# Patient Record
Sex: FEMALE | Race: WHITE
[De-identification: names, ages, dates, MRNs, and addresses within clinical notes are randomized per-mention and may not be internally consistent; named-entity substitution may affect disease eponyms.]

---

## 2017-09-07 NOTE — ED INTEGUMENTARY GENERAL
General


Chief Complaint:  Skin/Wound Problems


Stated Complaint:  SPIDER BITE RT BUTTOCKS


Nursing Triage Note:  


Pt has wound to R buttock that she thinks may be a spider bite. Pt states she 


first noticed wound on Saturday (9/2). Pt also c/o nausea that started 


yesterday.


Source:  patient, family (son)


Exam Limitations:  no limitations





History of Present Illness


Time seen by provider:  15:28


Initial Comments


Patient presents to ER with chief complaint of a wound on the back of her right 

leg just below her buttock. She says been there since Saturday or Sunday which 

is approximately 5-6 days. She is having some subjective fevers, chills, nausea 

and a bit of pain from the site. She has not sought help yet for this nor she 

been on antibiotic last 4-6 weeks. She does not have a history of hidradenitis 

separative up. She denies laying on that side for a prolonged period of time. 

She is ambulatory. She's never had a wound here before.


Patient is in a pain contract with her primary care physician for hydrocodone 

for her chronic back pain. She denies any other controlled substance use.





Allergies and Home Medications


Allergies


Coded Allergies:  


     propoxyphene (Unverified  Allergy, Mild, 4/29/10)


     adhesive (Unverified  Allergy, Unknown, RASH, 9/7/17)


     Iodinated Contrast- Oral and IV Dye (Verified  Adverse Reaction, 

Intermediate, NAUSEA, 10/26/13)


 SEVERE VOMITTING





Home Medications


Alprazolam 1 Mg Tablet, (Reported)


Aspirin 325 Mg Tabec, 325 MG PO DAILY, (Reported)


Citalopram Hydrobromide 10 Mg Tablet, (Reported)


Fluticasone/Salmeterol 1 Each Blst.w.dev, (Reported)


Hydrocodone/Acetaminophen 1 Each Tablet, (Reported)


Lisinopril 20 Mg Tablet, (Reported)


Omeprazole 20 Mg Capsule., (Reported)


Ondansetron 4 Mg Tab.rapdis, 4 MG PO Q6H PRN for NAUSEA/VOMITING-1ST LINE, #20 

Ref 0


   Prescribed by: DALILA JENKINS on 9/7/17 1622


Oxycodone Hcl/Acetaminophen 1 Each Tablet, 1 EACH PO Q4-6H PRN, #50 Ref 0 (

Reported)


Ranitidine HCl 150 Mg Tablet, (Reported)


Tiotropium Bromide 1 Inh Aerp, (Reported)





Constitutional:  No chills, No diaphoresis, No fever, malaise


EENTM:  No ear discharge, No ear pain


Respiratory:  No cough, No short of breath


Cardiovascular:  No chest pain, No syncope, No vascular heart diseas


Gastrointestinal:  No constipation, No diarrhea, No nausea


Genitourinary:  No discharge, No dysuria


Pregnant:  No


Musculoskeletal:  back pain (Chronic), No joint pain, No joint swelling


Skin:  see HPI


Psychiatric/Neurological:  Denies Headache, Denies Numbness, Denies Paresthesia





Past Medical-Social-Family Hx


Patient Social History


Recent Foreign Travel:  No


Contact w/Someone Who Travel:  No


Recent Infectious Disease Expo:  No





Immunizations Up To Date


Tetanus Booster (TDap):  Unknown


Date of Pneumonia Vaccine:  Jan 1, 2009





Seasonal Allergies


Seasonal Allergies:  Yes ("SINUS PROBLEMS")





Respiratory


Respiratory Disorders:  COPD





Reproductive System


Hx Reproductive Disorders:  No


Sexually Transmitted Disease:  No


HIV/AIDS:  No


GYN History:  Hysterectomy





Genitourinary


Genitourinary Disorders:  Kidney Infection, Kidney Stones





Gastrointestinal


Gastrointestinal Disorders:  Gastroesophageal Reflux, Ulcer





Musculoskeletal


Musculoskeletal Disorders:  Arthritis





HEENT


HEENT Disorders:  Cataract


Loss of Vision:  Denies


Hearing Impairment:  Denies





Psychosocial


Behavioral Health Disorders:  Sleep Difficulties, Anxiety, Depression





Blood Transfusions


Adverse Reaction to a Blood Tr:  No





Family Medical History


Significant Family History:  No Pertinent Family Hx


Family Medial History:  


Cancer


  03 MOTHER (SPINAL AND KIDNEY)


Cataract


  03 MOTHER


Congestive heart failure


  03 FATHER


Family history: Gastrointestinal disease


  09 BROTHER


Family history: Hypertension


  03 FATHER


  03 MOTHER


  09 BROTHER


Myocardial infarction


  03 FATHER


  09 BROTHER


Parkinson's disease


  03 MOTHER


Stroke


  03 MOTHER


  09 BROTHER (TIA)


  09 SISTER (TIA)





Physical Exam


Vital Signs





Vital Sign - Last 12Hours








 9/7/17





 13:55


 


Temp 98.7


 


Pulse 89


 


Resp 18


 


B/P (MAP) 96/59


 


Pulse Ox 96


 


O2 Delivery Room Air





Capillary Refill : Less Than 3 Seconds


General Appearance:  moderate distress, thin


Cardiovascular:  normal peripheral pulses, regular rate, rhythm, no edema


Respiratory:  lungs clear, normal breath sounds


Back:  normal inspection, no vertebral tenderness


Extremities:  non-tender, no pedal edema, normal capillary refill


Neurologic/Psychiatric:  alert, oriented x 3


Skin:  normal color, warm/dry, other (2.5 cm diameter round eschar covering a 

mildly reddened base without any significant induration beyond the borders of 

the eschar over the issue tuberosity right side.)


Lymphatic:  no adenopathy





Progress/Results/Core Measures


Results/Orders


Lab Results





Laboratory Tests








Test


  9/7/17


14:33 Range/Units


 


 


White Blood Count


  8.5 


  4.3-11.0


10^3/uL


 


Red Blood Count


  3.68 L


  4.35-5.85


10^6/uL


 


Hemoglobin 12.1  11.5-16.0  G/DL


 


Hematocrit 34 L 35-52  %


 


Mean Corpuscular Volume 93  80-99  FL


 


Mean Corpuscular Hemoglobin 33  25-34  PG


 


Mean Corpuscular Hemoglobin


Concent 35 


  32-36  G/DL


 


 


Red Cell Distribution Width 11.9  10.0-14.5  %


 


Platelet Count


  238 


  130-400


10^3/uL


 


Mean Platelet Volume 11.0 H 7.4-10.4  FL


 


Neutrophils (%) (Auto) 66  42-75  %


 


Lymphocytes (%) (Auto) 26  12-44  %


 


Monocytes (%) (Auto) 7  0-12  %


 


Eosinophils (%) (Auto) 1  0-10  %


 


Basophils (%) (Auto) 0  0-10  %


 


Neutrophils # (Auto) 5.6  1.8-7.8  X 10^3


 


Lymphocytes # (Auto) 2.2  1.0-4.0  X 10^3


 


Monocytes # (Auto) 0.6  0.0-1.0  X 10^3


 


Eosinophils # (Auto)


  0.1 


  0.0-0.3


10^3/uL


 


Basophils # (Auto)


  0.0 


  0.0-0.1


10^3/uL


 


Sodium Level 137  135-145  MMOL/L


 


Potassium Level 3.6  3.6-5.0  MMOL/L


 


Chloride Level 103    MMOL/L


 


Carbon Dioxide Level 23  21-32  MMOL/L


 


Anion Gap 11  5-14  MMOL/L


 


Blood Urea Nitrogen 15  7-18  MG/DL


 


Creatinine


  1.08 


  0.60-1.30


MG/DL


 


Estimat Glomerular Filtration


Rate 53 


   


 


 


BUN/Creatinine Ratio 14   


 


Glucose Level 109 H   MG/DL


 


Calcium Level 9.0  8.5-10.1  MG/DL


 


Total Bilirubin 1.7 H 0.1-1.0  MG/DL


 


Aspartate Amino Transf


(AST/SGOT) 32 


  5-34  U/L


 


 


Alanine Aminotransferase


(ALT/SGPT) 17 


  0-55  U/L


 


 


Alkaline Phosphatase 98    U/L


 


Total Protein 6.6  6.4-8.2  GM/DL


 


Albumin 4.0  3.2-4.5  GM/DL








My Orders





Orders - DALILA JENKINS


Cbc With Automated Diff (9/7/17 15:27)


Comprehensive Metabolic Panel (9/7/17 15:27)


Ondansetron Injection (Zofran Injectio (9/7/17 15:30)


Ondansetron  Oral Dissolve Tab (Zofran (9/7/17 16:15)


Rx-Ondansetron Po (Rx-Zofran Po) (9/7/17 16:27)


Rx-Ondansetron Po (Rx-Zofran Po) (9/7/17 16:22)





Medications Given in ED





Current Medications








 Medications  Dose


 Ordered  Sig/Latoya


 Route  Start Time


 Stop Time Status Last Admin


Dose Admin


 


 Ondansetron HCl  4 mg  ONCE  ONCE


 PO  9/7/17 16:15


 9/7/17 16:16 DC 9/7/17 16:09


4 MG








Vital Signs/I&O





Vital Sign - Last 12Hours








 9/7/17





 13:55


 


Temp 98.7


 


Pulse 89


 


Resp 18


 


B/P (MAP) 96/59


 


Pulse Ox 96


 


O2 Delivery Room Air














Blood Pressure Mean:  71











Consults


Consults :  


   Consulting Physician:  PADDY MCELROY MD


Consults Notes


Discussed the case and he agrees with this can be [facility] outpatient and would 

be happy to see it in the clinic afternoon Monday. He asked that we call the 

clinic get this scheduled for her.





Departure


Impression


Impression:  


 Primary Impression:  


 Pressure ulcer


 Qualified Codes:  L89.310 - Pressure ulcer of right buttock, unstageable


Disposition:  01 HOME, SELF-CARE


Condition:  Stable





Departure-Patient Inst.


Decision time for Depature:  16:19


Referrals:  


SWAPNA FOWLER DO (PCP/Family)


Primary Care Physician


Patient Instructions:  Pressure Sores (DC)





Add. Discharge Instructions:  


Keep the wound clean and use soap and water. If you have fever or intractable 

nausea and diarrhea you should return to the ER otherwise  the Zofran 

and take one tablet under the tongue every 6 hours as needed to control your 

nausea. Plan on following up with the general surgeon, Dr. Mcelroy on Monday, September 11 at 4 PM. If you need to reschedule for have questions his clinic 

number is [phone]. All discharge instructions reviewed with patient and/or 

family. Voiced understanding.


Scripts


Ondansetron (Zofran Odt) 4 Mg Tab.rapdis


4 MG PO Q6H Y for NAUSEA/VOMITING-1ST LINE, #20 TAB 0 Refills


   Prov: DALILA JENKINS         9/7/17





Copy


Copies To 1:   PADDY MCELROY MD, TITUS J Sep 7, 2017 15:30

## 2019-07-30 NOTE — DIAGNOSTIC IMAGING REPORT
PROCEDURE: CT head and CT cervical spine without contrast.



TECHNIQUE: Multiple contiguous axial images were obtained through

the brain and cervical spine without the use of intravenous

contrast. Sagittal and coronal reformations through the cervical

spine were then performed. Auto Exposure Controls were utilized

during the CT exam to meet ALARA standards for radiation dose

reduction. 



INDICATION: Fall.



COMPARISON: Comparison is made with prior CT from 10/09/2009.



FINDINGS:



CT head:



Ventricles and sulci are within normal limits. No sulcal

effacement or midline shift is seen. No acute intra-axial or

extra-axial hemorrhage is detected. Cisterns are patent. The

visualized paranasal sinuses are clear.



IMPRESSION: No acute intracranial process is detected.



CT cervical spine:



Curvature and alignment of the cervical spine is normal. There is

some generalized degenerative disc and facet disease with

variable disc space narrowing and marginal spurring. No fractures

are seen. Prevertebral tissues are within normal limits. The

odontoid is intact.



IMPRESSION: Cervical spondylosis. No acute bony abnormality is

detected.



Dictated by: 



  Dictated on workstation # [id number]

## 2019-07-30 NOTE — XMS REPORT
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                                             -                

 

                    Urine Ketones                      2013 12:00pm                      NEGATIVE

                                                    -                

 

                          Urine Leukocyte Esterase                      2013 12:00pm            

                    1+                      H                      -                

 

                          Urine Methamphetamines Screen                      2009 9:05am        

                    NEGATIVE                                             -                

 

                    Urine Mucus                      2013 12:00pm                      SMALL

  /LPF                      H                         -                

 

                    Urine Nitrite                      2013 12:00pm                      POSITIVE

                          H                         -                

 

                          Urine Opiates Screen                      2009 9:05am                 

                    POSITIVE                      H                      -                

 

                          Urine Phencyclidine Screen                      2009 9:05am           

                    NEGATIVE                                             -                

 

                          Urine Pregnancy Test                      2009 12:48am                  

                    NEGATIVE                                             -                

 

                    Urine Protein                      2013 12:00pm                      2+ 

                          H                         -                

 

                    Urine RBC                      2013 12:00pm                      NONE  /HPF

                                                    -                

 

                          Urine Specific Gravity                      2013 12:00pm              

                    1.020                                             -                

 

                          Urine Squamous Epithelial Cells                      2013 12:00pm     

                    2-5  /HPF                                             -               

 

 

                          Urine Urobilinogen                      2013 12:00pm                  

                    NORMAL  MG/DL                                             -                

 

                    Urine WBC                      2013 12:00pm                      5-10  /HPF

                          H                         -                

 

                    Urine pH                      2013 12:00pm                      6       

                                                    -                

 

                          Vancomycin Level Trough                      May 16, 2010 1:12am                  

                    6.6  MCG/ML                      L                      10-20                

 

                    White Blood Count                      2013 5:22am                      

8.2  10^3/uL                      N                         4.3-11.0                

 

                    Barbiturate Screen                      July 15, 2006 2:05am                      Negative

                                                    -                

 

                    Serum Alcohol                      2009 8:17am                      < 5 

 MG/DL                                                -5                

 

                          Lab Scanned Report                      September 15, 2010 8:00pm                 

                    Referred Lab Report  [id number]                                             -        

        

 

                          Estimat Glomerular Filtration Rate                      2013 11:05am  

                    > 60                                             -                

 

                          Blood Morphology Comment                      October 10, 2009 6:29am             

                    NORMAL                                             -                

 

                          Urine Methadone Screen                      2009 9:05am               

                    POSITIVE                      H                      -                

 

                          Creatine Kinase                      September 15, 2010 11:24pm                   

                    96  mg/dl                      N                                      

 

                          Urine Cannabinoids Screen                      2009 9:05am            

                    NEGATIVE                                             -                

 

                          Cardiac Panel Pathologist Review                      September 15, 2010 5:10pm   

                    SEE CARDIAC PATH REV                                             -  

              

 

                          Stool Occult Blood Immunoassay                      2009 9:40am         

                    NEGATIVE                                             -                

 

                    Urine RBC (Auto)                      2013 12:00pm                      

2+                        H                         -                



                                                                                
                                                                                
                                                                                
                                                                                
                                                                                
                                                                                
                                                                                
                                                                                
                                                                                
                                                                                
                                                                                
                                                                                
                                                                                
                                                                 



Procedures

                      





                    Procedure                      Code                      Date                

 

                    COLONOSCOPY                      45.23                      09                



 

                    TOTAL HIP REPLACEMENT                      81.51                      05/24/10      

          

 

                    TOTAL HIP REPLACEMENT                      81.51                      05/11/10      

          

 

                    VENOUS CATHETERIZATION NEC                      38.93                      05/16/10 

               

 

                    Blood Culture                                             05/14/10                

 

                    Ova and Parasites                                             09              

  

 

                    MRSA Screen                                             05/23/10                

 

                    Urine Culture                                             10/26/13                

 

                    Gram Stain                                             08                



                                                                                
                                                                             



Encounters

                      





                    Encounter                      Location                      Date/Time              

  

 

                    Pre-admitted Inpatient                      [facility]                      10/28/13

 9:10am                

 

                    Discharged Inpatient                      [facility]                      10/26/13

 2:25pm                

 

                    Departed Emergency Room                      [facility]                      13

 10:01pm

## 2019-07-30 NOTE — DIAGNOSTIC IMAGING REPORT
INDICATION: Fall with chest pain.



Frontal chest obtained at 12:11 p.m. and compared to 09/15/2010.



Heart and mediastinal silhouette are normal in appearance. The

lungs show no focal infiltrate. There is a calcified granuloma in

the right upper lobe. There is no pneumothorax or pleural fluid.

There is no overt bony abnormality visualized in the chest.



IMPRESSION: No acute process in the chest.



Dictated by: 



  Dictated on workstation # [id number]

## 2019-07-30 NOTE — ED SYNCOPE
General


Stated Complaint:  FALL


Source of Information:  Patient


Exam Limitations:  No Limitations





History of Present Illness


Date Seen by Provider:  2019


Time Seen by Provider:  10:05


Initial Comments


Here with report of syncopal episode this morning. She apparently was walking 

across the street when she passed out as she was stepping up on a curb. This was

witnessed by a neighbor. She complains of some mild neck pain. She is unsure why

she passed out but states that she has not been eating or drinking well. She 

does have a cough. Complains of epigastric abdominal pain that radiates up to 

her chest. EMS unable to start IV due to poor veins. Further questioning later, 

patient reports that she's had some episodes of the stomach discomfort that 

radiates up into her chest after stressful events. She had that yesterday after 

an argument with one of her signs and again today for the same reason that's why

she was walking across history because she was walking away from him when the 

episode occurred.


Timing/Prior Episodes:  No Prior History


Symptoms Prior to Episode:  Lightheadedness


Precipitating Factors:  Activity


Loss of Consciousness:  Brief (Seconds)


Current Symptoms:  Chest Pain, Headache, Lightheadedness; No Shallow/Rapid 

Breathing, No Weak/Absent Pulse; Weakness





Allergies and Home Medications


Allergies


Coded Allergies:  


     propoxyphene (Unverified  Allergy, Mild, 4/29/10)


     adhesive (Unverified  Allergy, Unknown, RASH, 17)


     Iodinated Contrast- Oral and IV Dye (Verified  Adverse Reaction, 

Intermediate, NAUSEA, 10/26/13)


   


   SEVERE VOMITTING





Home Medications


Aspirin 325 Mg Tabec, 325 MG PO DAILY, (Reported)


Ondansetron 4 Mg Tab.rapdis, 4 MG PO Q6H PRN for NAUSEA/VOMITING-1ST LINE


   Prescribed by: DALILA JENKINS on 17 1622


Oxycodone Hcl/Acetaminophen 1 Each Tablet, 1 EACH PO Q4-6H PRN, (Reported)





Patient Home Medication List


Home Medication List Reviewed:  Yes





Review of Systems


Constitutional:  see HPI; No chills, No fever


EENTM:  no symptoms reported


Respiratory:  No cough, No short of breath


Cardiovascular:  chest pain; No palpitations; syncope


Gastrointestinal:  abdominal pain; No nausea, No vomiting


Genitourinary:  no symptoms reported


Musculoskeletal:  muscle pain, neck pain


Skin:  no symptoms reported


Psychiatric/Neurological:  See HPI, Headache; Denies Numbness, Denies Tingling





All Other Systems Reviewed


Negative Unless Noted:  Yes





Past Medical-Social-Family Hx


Past Med/Social Hx:  Reviewed Nursing Past Med/Soc Hx


Patient Social History


Alcohol Use:  Denies Use


Recreational Drug Use:  No


Smoking Status:  Current Everyday Smoker


Type Used:  Cigarettes


Recent Hopitalizations:  Yes (PNEUMONIA, KIDNEY INFECTIONS, SURGERIES)





Immunizations Up To Date


Tetanus Booster (TDap):  Unknown


Date of Pneumonia Vaccine:  2009





Seasonal Allergies


Seasonal Allergies:  Yes ("SINUS PROBLEMS")





Past Medical History


Surgeries:  Yes (HYST,APPY,SAMARIA, COCCYX BONE, R HIP)


Appendectomy, Gallbladder, Hysterectomy, Orthopedic


Respiratory:  Yes


COPD


Cardiac:  Yes (WPW)


Neurological:  No


Reproductive Disorders:  No


GYN History:  Hysterectomy


Sexually Transmitted Disease:  No


HIV/AIDS:  No


Kidney Infection, Kidney Stones


Gastrointestinal:  Yes


Gastroesophageal Reflux, Ulcer


Musculoskeletal:  Yes


Arthritis


Endocrine:  No


Cataract


Loss of Vision:  Denies


Hearing Impairment:  Denies


Cancer:  No


Psychosocial:  No


Sleep Difficulties, Anxiety, Depression


Integumentary:  No (BLISTERS FROM RECENT TAPE)


Blood Disorders:  No


Adverse Reaction/Blood Tranf:  No





Family Medical History


Reviewed Nursing Family Hx





Cancer


  03 MOTHER (SPINAL AND KIDNEY)


Cataract


  03 MOTHER


Congestive heart failure


  03 FATHER


Family history: Gastrointestinal disease


  09 BROTHER


Family history: Hypertension


  03 FATHER


  03 MOTHER


  09 BROTHER


Myocardial infarction


  03 FATHER


  09 BROTHER


Parkinson's disease


  03 MOTHER


Stroke


  03 MOTHER


  09 BROTHER (TIA)


  09 SISTER (TIA)


No Pertinent Family Hx





Physical Exam


Vital Signs





Vital Signs - First Documented








 19





 10:04


 


Temp 96.7


 


Pulse 86


 


Resp 12


 


B/P (MAP) 94/81 (85)





Capillary Refill :


Height, Weight, BMI


Height: 5'5.00"


Weight: 154lbs. oz. 69.451438lo;  BMI


Method:Stated


General Appearance:  No Apparent Distress, WD/WN


HEENT:  PERRL/EOMI, Pharynx Normal


Neck:  Non Tender, Supple


Cardiovascular:  Regular Rate, Rhythm, No Murmur


Respiratory:  Lungs Clear, Normal Breath Sounds


Gastrointestinal:  Non Tender, Soft


Back:  Normal Inspection, No CVA Tenderness, No Vertebral Tenderness


Neurologic/Psychiatric:  Alert, Oriented x3


Cranial Nerves:  Normal Speech, PERRL


Motor/Sensory:  No Motor Deficit, No Sensory Deficit


Skin:  Normal Color, Warm/Dry





Progress/Results/Core Measures


Results/Orders


Lab Results





Laboratory Tests








Test


 19


10:22 19


11:24 19


12:33 Range/Units


 


 


Troponin I < 0.028    <0.028  NG/ML


 


White Blood Count


 


 8.7 


 


 4.3-11.0


10^3/uL


 


Red Blood Count


 


 4.06 L


 


 4.35-5.85


10^6/uL


 


Hemoglobin  13.1   11.5-16.0  G/DL


 


Hematocrit  37   35-52  %


 


Mean Corpuscular Volume  90   80-99  FL


 


Mean Corpuscular Hemoglobin  32   25-34  PG


 


Mean Corpuscular Hemoglobin


Concent 


 36 


 


 32-36  G/DL





 


Red Cell Distribution Width  13.1   10.0-14.5  %


 


Platelet Count


 


 162 


 


 130-400


10^3/uL


 


Mean Platelet Volume  9.9   7.4-10.4  FL


 


Neutrophils (%) (Auto)  44   42-75  %


 


Lymphocytes (%) (Auto)  45 H  12-44  %


 


Monocytes (%) (Auto)  9   0-12  %


 


Eosinophils (%) (Auto)  2   0-10  %


 


Basophils (%) (Auto)  1   0-10  %


 


Neutrophils # (Auto)  3.8   1.8-7.8  X 10^3


 


Lymphocytes # (Auto)  3.9   1.0-4.0  X 10^3


 


Monocytes # (Auto)  0.8   0.0-1.0  X 10^3


 


Eosinophils # (Auto)


 


 0.2 


 


 0.0-0.3


10^3/uL


 


Basophils # (Auto)


 


 0.1 


 


 0.0-0.1


10^3/uL


 


Sodium Level  140   135-145  MMOL/L


 


Potassium Level  3.9   3.6-5.0  MMOL/L


 


Chloride Level  110 H    MMOL/L


 


Carbon Dioxide Level  16 L  21-32  MMOL/L


 


Anion Gap  14   5-14  MMOL/L


 


Blood Urea Nitrogen  14   7-18  MG/DL


 


Creatinine


 


 0.98 


 


 0.60-1.30


MG/DL


 


Estimat Glomerular Filtration


Rate 


 59 


 


  





 


BUN/Creatinine Ratio  14    


 


Glucose Level  72     MG/DL


 


Calcium Level  9.4   8.5-10.1  MG/DL


 


Corrected Calcium  9.2   8.5-10.1  MG/DL


 


Magnesium Level  2.4   1.8-2.4  MG/DL


 


Total Bilirubin  0.7   0.1-1.0  MG/DL


 


Aspartate Amino Transf


(AST/SGOT) 


 25 


 


 5-34  U/L





 


Alanine Aminotransferase


(ALT/SGPT) 


 11 


 


 0-55  U/L





 


Alkaline Phosphatase  128     U/L


 


C-Reactive Protein High


Sensitivity 


 0.28 


 


 0.00-0.50


MG/DL


 


Total Protein  7.4   6.4-8.2  GM/DL


 


Albumin  4.3   3.2-4.5  GM/DL


 


Urine Color   YELLOW   


 


Urine Clarity   CLEAR   


 


Urine pH   5  5-9  


 


Urine Specific Gravity   1.015 L 1.016-1.022  


 


Urine Protein   NEGATIVE  NEGATIVE  


 


Urine Glucose (UA)   NEGATIVE  NEGATIVE  


 


Urine Ketones   NEGATIVE  NEGATIVE  


 


Urine Nitrite   NEGATIVE  NEGATIVE  


 


Urine Bilirubin   NEGATIVE  NEGATIVE  


 


Urine Urobilinogen   NORMAL  NORMAL  MG/DL


 


Urine Leukocyte Esterase   NEGATIVE  NEGATIVE  


 


Urine RBC (Auto)   NEGATIVE  NEGATIVE  


 


Urine RBC   NONE   /HPF


 


Urine WBC   NONE   /HPF


 


Urine Squamous Epithelial


Cells 


 


 RARE 


  /HPF





 


Urine Crystals   NONE   /LPF


 


Urine Bacteria   NEGATIVE   /HPF


 


Urine Casts   NONE   /LPF


 


Urine Mucus   NEGATIVE   /LPF


 


Urine Culture Indicated   NO   








My Orders





Orders - SWAPNA MAIER MD


Ns Iv 1000 Ml (Sodium Chloride 0.9%) (19 10:15)


Ct Head/Cervical Spine Wo (19 10:25)


Chest 1 View, Ap/Pa Only (19 10:25)


Cbc With Automated Diff (19 10:25)


Comprehensive Metabolic Panel (19 10:25)


Hs C Reactive Protein (19 10:25)


Magnesium (19 10:25)


Ua Culture If Indicated (19 10:25)


Ekg Tracing (19 10:25)


Monitor-Rhythm Ecg Trace Only (19 10:25)


Ed Iv/Invasive Line Start (19 10:25)


Ns Iv 1000 Ml (Sodium Chloride 0.9%) (19 10:25)


Lidocaine 2% Viscous 15 Ml (Xylocaine Vi (19 10:45)


Antacid  Suspension (Mylanta  Suspension (19 10:45)


Famotidine Injection (Pepcid Injection) (19 10:32)


Troponin I (19 10:25)


Aspirin Chewable Tablet (Baby Aspirin Ch (19 12:25)





Medications Given in ED





Current Medications








 Medications  Dose


 Ordered  Sig/Latoya


 Route  Start Time


 Stop Time Status Last Admin


Dose Admin


 


 Al Hydrox/Mg


 Hydrox/Simethicone  30 ml  ONCE  ONCE


 PO  19 10:45


 19 10:46 DC 19 10:59


30 ML


 


 Lidocaine HCl  15 ml  ONCE  ONCE


 PO  19 10:45


 19 10:46 DC 19 10:59


15 ML


 


 Sodium Chloride  1,000 ml @ 


 0 mls/hr  Q0M ONCE


 IV  19 10:25


 19 10:28 DC 19 10:22


0 MLS/HR








Vital Signs/I&O











 19





 10:04


 


Temp 96.7


 


Pulse 86


 


Resp 12


 


B/P (MAP) 94/81 (85)











Progress


Progress Note :  


Progress Note


Seen and evaluated. CT head and neck ordered. We will get labs, EKG and chest x-

ray due to the chest discomfort. Normal saline 1 L bolus. Monitor patient. 1220:

Patient has removed her c-collar. CT is negative so we will ahead and cleared 

her C-spine. Still pending troponin level. Patient did get GI cocktail and 

Pepcid 20 mg IV and that seems to help. We'll go ahead and give her aspirin now.

Monitor patient. 1325: Troponin negative. Discharged home with return 

precautions. Patient verbalize understanding instructions and agreement with 

plan. She will follow-up with cardiology and information was given to her.


Initial ECG Impression Date:  2019


Initial ECG Impression Time:  10:10


Initial ECG Rate:  79


Initial ECG Rhythm:  Normal Sinus


Comment


Sinus rhythm with flat T waves throughout. No evidence of ST elevation MI. 

Similar to previous of 9/15/10. Normal axis. Interpreted by me.





Diagnostic Imaging





   Diagonstic Imaging:  CT


   Plain Films/CT/US/NM/MRI:  c-spine, head


Comments


                 [facility].


                                [place], [place]





NAME:   YVONNE NICOLAS J


MED REC#:   [id number]


ACCOUNT#:   [id number]


PT STATUS:   REG ER


:   1960


PHYSICIAN:   SWAPNA MAIER MD


ADMIT DATE:   19/ER


                                   ***Draft***


Date of Exam:19





CT HEAD/CERVICAL SPINE WO








PROCEDURE: CT head and CT cervical spine without contrast.





TECHNIQUE: Multiple contiguous axial images were obtained through


the brain and cervical spine without the use of intravenous


contrast. Sagittal and coronal reformations through the cervical


spine were then performed. Auto Exposure Controls were utilized


during the CT exam to meet ALARA standards for radiation dose


reduction. 





INDICATION: Fall.





COMPARISON: Comparison is made with prior CT from 10/09/2009.





FINDINGS:





CT head:





Ventricles and sulci are within normal limits. No sulcal


effacement or midline shift is seen. No acute intra-axial or


extra-axial hemorrhage is detected. Cisterns are patent. The


visualized paranasal sinuses are clear.





IMPRESSION: No acute intracranial process is detected.





CT cervical spine:





Curvature and alignment of the cervical spine is normal. There is


some generalized degenerative disc and facet disease with


variable disc space narrowing and marginal spurring. No fractures


are seen. Prevertebral tissues are within normal limits. The


odontoid is intact.





IMPRESSION: Cervical spondylosis. No acute bony abnormality is


detected.





  Dictated on workstation # [id number]








Dict:   19 1203


Trans:   19 1209


[facility] [id number]





Interpreted by:     SKY MEZA MD


Electronically signed by:








   Diagonstic Imaging:  Xray


   Plain Films/CT/US/NM/MRI:  chest


Comments


                 [facility] [place], [place]





NAME:   YVONNE NICOLAS J


MED REC#:   [id number]


ACCOUNT#:   [id number]


PT STATUS:   REG ER


:   1960


PHYSICIAN:   SWAPNA MAIER MD


ADMIT DATE:   19/ER


                                   ***Draft***


Date of Exam:19





CHEST 1 VIEW, AP/PA ONLY








INDICATION: Fall with chest pain.





Frontal chest obtained at 12:11 p.m. and compared to 09/15/2010.





Heart and mediastinal silhouette are normal in appearance. The


lungs show no focal infiltrate. There is a calcified granuloma in


the right upper lobe. There is no pneumothorax or pleural fluid.


There is no overt bony abnormality visualized in the chest.





IMPRESSION: No acute process in the chest.





  Dictated on workstation # [id number]








Dict:   19 1222


Trans:   19 1237


 [id number]





Interpreted by:     SEAN WILKINSON MD


Electronically signed by:





Departure


Impression





   Primary Impression:  


   Syncope


   Qualified Codes:  R55 - Syncope and collapse


   Additional Impressions:  


   Acute epigastric pain


   Acute chest pain


Disposition:  01 HOME, SELF-CARE


Condition:  Stable





Departure-Patient Inst.


Decision time for Depature:  13:23


Referrals:  


SVETA RIVAS M RIZWAN MD SPRENKLE, TIMOTHY L DO (PCP)


Primary Care Physician


Patient Instructions:  Chest Pain, Acute Abdomen (Belly Pain), Adult (DC), 

Syncope (Fainting)





Add. Discharge Instructions:  


Call and make appointment with Dr. Huber, cardiologist, for appointment within 

the next week or 2. You should also call Dr. Rivas's office (pulmonologist) for

appointment and follow-up with him as well for your lung problems. Return for 

worse pain, fever, vomiting, weakness, breathing problems or other concerns as 

needed. Continue to stop smoking as you are right now.


Scripts


Ipratropium Bromide (Atrovent Hfa) 12.9 Gm Aers


2 PUFF IH Q6H, #1 INHALER 0 Refills


   Prov: SWAPNA MAIER MD         19


Work/School Note:  Local Medical Staff Listing











SWAPNA MAIER MD          2019 11:10

## 2019-07-30 NOTE — XMS REPORT
Continuity of Care Document

                             Created on: 2019



YVONNE NICOLAS

External Reference #: [id number]

: 1960

Sex: Female



Demographics







                          Address                   [address] [place]  [address]

 

                          Home Phone                [phone] x

 

                          Preferred Language        Unknown

 

                          Marital Status            Unknown

 

                          Denominational Affiliation     Unknown

 

                          Race                      Unknown

 

                          Ethnic Group              Unknown





Author







                          Organization              Unknown

 

                          Address                   Unknown

 

                          Phone                     Unavailable



              



Allergies

      





             Active              Description              Code              Type              Severity

                Reaction              Onset              Reported/Identified              Relationship

 to Patient                             Clinical Status        

 

                Yes              propoxyphene              [id number]              Drug Allergy       

             Mild              N/A                             2010                        

                                                 

 

                    Yes                 Iodinated Contrast- Oral and IV Dye              [id number]        

             Drug Allergy              Moderate              NAUSEA                             10/26/2013

                                                             

 

                Yes              adhesive              [id number]              Drug Allergy           

             Unknown              RASH                             2017                        

                                                 



                      



Medications

      



There is no data.                  



Problems

      





             Date Dx Coded              Attending              Type              Code              Diagnosis

                                        Diagnosed By        

 

                10/28/2013              GHASSAN ZHU MD              Ot              008.8       

                          VIRAL ENTERITIS NOS                       

 

                10/28/2013              GHASSAN ZHU MD              Ot              041.49      

                          OTHER AND UNSPECIFIED ESCHERICHIA COLI [                       

 

                10/28/2013              GHASSAN ZHU MD              Ot              305.1       

                          TOBACCO USE DISORDER                       

 

                10/28/2013              GHASSAN ZHU MD              Ot              599.0       

                          URIN TRACT INFECTION NOS                       

 

                10/28/2013              GHASSAN ZHU MD              Ot              719.45      

                          JOINT PAIN-PELVIS                       

 

                2013              YOHANNES ZAVALETA MD              Ot              305.1         

                          TOBACCO USE DISORDER                       

 

                2013              YOHANNES ZAVALETA MD              Ot              496           

                          CHR AIRWAY OBSTRUCT NEC                       

 

                2013              YOHANNES ZAVALETA MD              Ot              996.41        

                          MECHANICAL LOOSENING OF PROSTHETIC JOINT                       

 

                2013              YOHANNES ZAVALETA MD              Ot              V43.64        

                          HIP JOINT REPLACEMENT STATUS                       

 

                2013              TRAM WOOD MD              Ot              959.6        

                          HIP   THIGH INJURY NOS                       

 

                2013              TRAM WOOD MD              Ot              959.7        

                          LOWER LEG INJURY NOS                       

 

                2013              TRAM WOOD MD              Ot              E000.8       

                          OTHER EXTERNAL CAUSE STATUS                       

 

                2013              TRAM WOOD MD              Ot              E849.0       

                          ACCIDENT IN HOME                       

 

                2013              TRAM WOOD MD              Ot              E884.4       

                          FALL FROM BED                       

 

                2017              YOHANNES ZAVALETA MD              Ot              V43.64        

                          HIP JOINT REPLACEMENT STATUS                       

 

                2017              YOHANNES ZAVALETA MD              Ot              V67.09        

                          SURGERY FOLLOW-UP, OTHER SURGERY                       

 

                2017              YOHANNES ZAVALETA MD              Ot              996.41        

                          MECHANICAL LOOSENING OF PROSTHETIC JOINT                       

 

                2017              YOHANNES ZAVALETA MD              Ot              V43.64        

                          HIP JOINT REPLACEMENT STATUS                       

 

                2017              YOHANNES ZAVALETA MD              Ot              791.9         

                          ABN URINE FINDINGS NEC                       

 

                2017              YOHANNES ZAVALETA MD              Ot              996.41        

                          MECHANICAL LOOSENING OF PROSTHETIC JOINT                       

 

                2017              YOHANNES ZAVALETA MD              Ot              V72.63        

                          PRE-PROCEDURAL LABORATORY EXAMINATION                       

 

                2017              DALILA JENKINS MD              Ot              F32.9         

                          MAJOR DEPRESSIVE DISORDER, SINGLE EPISOD                       

 

                2017              DALILA JENKINS MD              Ot              F41.9         

                          ANXIETY DISORDER, UNSPECIFIED                       

 

                2017              DALILA JENKINS MD              Ot              J44.9         

                          CHRONIC OBSTRUCTIVE PULMONARY DISEASE, U                       

 

                2017              DALILA JENKINS MD              Ot              K21.9         

                          GASTRO-ESOPHAGEAL REFLUX DISEASE WITHOUT                       

 

                2017              DALILA JENKINS MD              Ot              L89.310       

                          PRESSURE ULCER OF RIGHT BUTTOCK, UNSTAGE                       

 

                2017              DALILA JENKINS MD              Ot              M19.90        

                          UNSPECIFIED OSTEOARTHRITIS, UNSPECIFIED                        

 

                2017              DALILA JENKINS MD              Ot              Z79.82        

                          LONG TERM (CURRENT) USE OF ASPIRIN                       

 

                2017              DALILA JENKINS MD              Ot              Z82.49        

                          FAMILY HX OF ISCHEM HEART DIS AND OTH DI                       

 

                2017              DALILA JENKINS MD              Ot              Z85.528       

                          PERSONAL HISTORY OF OTHER MALIGNANT NEOP                       

 

                2017              DALILA JENKINS MD              Ot              Z87.442       

                          PERSONAL HISTORY OF URINARY CALCULI                       

 

                2017              DALILA JENKINS MD              Ot              Z87.448       

                          PERSONAL HISTORY OF OTHER DISEASES OF UR                       

 

                2017              DALILA JENKINS MD              Ot              Z90.710       

                          ACQUIRED ABSENCE OF BOTH CERVIX AND UTER                       

 

                2017              DALILA JENKINS MD              Ot              Z04.3         

                          ENCOUNTER FOR EXAM AND OBSERVATION FOLLO                       

 

                2017              DALILA JENKINS MD              Ot              F32.9         

                          MAJOR DEPRESSIVE DISORDER, SINGLE EPISOD                       

 

                2017              DALILA JENKINS MD              Ot              F41.9         

                          ANXIETY DISORDER, UNSPECIFIED                       

 

                2017              DALILA JENKINS MD              Ot              J44.9         

                          CHRONIC OBSTRUCTIVE PULMONARY DISEASE, U                       

 

                2017              DALILA JENKINS MD              Ot              K21.9         

                          GASTRO-ESOPHAGEAL REFLUX DISEASE WITHOUT                       

 

                2017              DALILA JENKINS MD              Ot              L89.310       

                          PRESSURE ULCER OF RIGHT BUTTOCK, UNSTAGE                       

 

                2017              DALILA JENKINS MD              Ot              M19.90        

                          UNSPECIFIED OSTEOARTHRITIS, UNSPECIFIED                        

 

                2017              DALILA JENKINS MD              Ot              Z79.82        

                          LONG TERM (CURRENT) USE OF ASPIRIN                       

 

                2017              DALILA JENKINS MD              Ot              Z82.49        

                          FAMILY HX OF ISCHEM HEART DIS AND OTH DI                       

 

                2017              DALILA JENKINS MD              Ot              Z85.528       

                          PERSONAL HISTORY OF OTHER MALIGNANT NEOP                       

 

                2017              DALILA JENKINS MD              Ot              Z87.442       

                          PERSONAL HISTORY OF URINARY CALCULI                       

 

                2017              DALILA JENKINS MD              Ot              Z87.448       

                          PERSONAL HISTORY OF OTHER DISEASES OF UR                       

 

                2017              DALILA JENKINS MD              Ot              Z90.710       

                          ACQUIRED ABSENCE OF BOTH CERVIX AND UTER                       

 

                2017              REVEAL YOHANNES CARRILLO              Ot              V43.64        

                          HIP JOINT REPLACEMENT STATUS                       

 

                2017              REVEAL YOHANNES CARRILLO Ot              V67.09        

                          SURGERY FOLLOW-UP, OTHER SURGERY                       

 

                2017              REVEAL YOHANNES CARRILLO              Ot              996.41        

                          MECHANICAL LOOSENING OF PROSTHETIC JOINT                       

 

                2017              REVEAL YOHANNES CARRILLO Ot              V43.64        

                          HIP JOINT REPLACEMENT STATUS                       

 

                2017              REVEAL YOHANNES CARRILLO              Ot              791.9         

                          ABN URINE FINDINGS NEC                       

 

                2017              REVEAL YOHANNES CARRILLO Ot              996.41        

                          MECHANICAL LOOSENING OF PROSTHETIC JOINT                       

 

                2017              REVEAL YOHANNES CARRILLO              Ot              V72.63        

                          PRE-PROCEDURAL LABORATORY EXAMINATION                       

 

                09/15/2017              REVEAL YOHANNES CARRILLO Ot              V43.64        

                          HIP JOINT REPLACEMENT STATUS                       

 

                09/15/2017              REVEAL YOHANNES CARRILLO Ot              V67.09        

                          SURGERY FOLLOW-UP, OTHER SURGERY                       

 

                09/15/2017              REVEAL YOHANNES CARRILLO Ot              996.41        

                          MECHANICAL LOOSENING OF PROSTHETIC JOINT                       

 

                09/15/2017              REVEAL YOHANNES CARRILLO Ot              V43.64        

                          HIP JOINT REPLACEMENT STATUS                       

 

                09/15/2017              REVEAL YOHANNES CARRILLO              Ot              791.9         

                          ABN URINE FINDINGS NEC                       

 

                09/15/2017              REVEAL YOHANNES CARRILLO              Ot              996.41        

                          MECHANICAL LOOSENING OF PROSTHETIC JOINT                       

 

                09/15/2017              REVEAL YOHANNES CARRILLO              Ot              V72.63        

                          PRE-PROCEDURAL LABORATORY EXAMINATION                       

 

                2017              EVA VIDAL MD, Ot              L98.492      

                          NON-PRS CHRONIC ULCER OF SKIN OF SITES W                       

 

                2017              EVA VIDAL MD, Ot              T63.331A     

                          TOXIC EFFECT OF VENOM OF BROWN RECLUSE S                       

 

                2017              EVA VIDAL MD, Ot              T65.222A     

                          TOXIC EFFECT OF TOBACCO CIGARETTES, SELF                       

 

                2017              EVA VIDAL MD, Ot              L98.492      

                          NON-PRS CHRONIC ULCER OF SKIN OF SITES W                       

 

                2017              EVA VIDAL MD, Ot              T63.331A     

                          TOXIC EFFECT OF VENOM OF BROWN RECLUSE S                       

 

                2017              EVA VIDAL MD, Ot              T65.222A     

                          TOXIC EFFECT OF TOBACCO CIGARETTES, SELF                       

 

                10/06/2017              EVA VIDAL MD, Ot              L98.492      

                          NON-PRS CHRONIC ULCER OF SKIN OF SITES W                       

 

                10/06/2017              EVA VIDAL MD, Ot              T63.331A     

                          TOXIC EFFECT OF VENOM OF BROWN RECLUSE S                       

 

                10/06/2017              EVA VIDAL MD, Ot              T65.222A     

                          TOXIC EFFECT OF TOBACCO CIGARETTES, SELF                       

 

                10/06/2017              EVA VIDAL MD, Ot              B18.9        

                          CHRONIC VIRAL HEPATITIS, UNSPECIFIED                       

 

                10/06/2017              EVA VIDAL MD, Ot              L98.491      

                          NON-PRS CHRONIC ULCER SKIN/ SITES LIMITE                       

 

                10/06/2017              EVA VIDAL MD, Ot              L98.492      

                          NON-PRS CHRONIC ULCER OF SKIN OF SITES W                       

 

                10/06/2017              EVA VIDAL MD, Ot              S51.801A     

                          UNSPECIFIED OPEN WOUND OF RIGHT FOREARM,                       

 

                10/06/2017              EVA VIDAL MD, Ot              T63.331S     

                          TOXIC EFFECT OF VENOM OF BROWN RECLUSE S                       

 

                10/06/2017              EVA VIDAL MD, Ot              T65.222A     

                          TOXIC EFFECT OF TOBACCO CIGARETTES, SELF                       

 

                10/11/2017              EVA VIDAL MD, Ot              B18.9        

                          CHRONIC VIRAL HEPATITIS, UNSPECIFIED                       

 

                10/11/2017              EVA VIDAL MD, Ot              L98.491      

                          NON-PRS CHRONIC ULCER SKIN/ SITES LIMITE                       

 

                10/11/2017              EVA VIDAL MD, Ot              L98.492      

                          NON-PRS CHRONIC ULCER OF SKIN OF SITES W                       

 

                10/11/2017              EVA VIDAL MD, Ot              S51.801A     

                          UNSPECIFIED OPEN WOUND OF RIGHT FOREARM,                       

 

                10/11/2017              EVA VIDAL MD, Ot              T63.331S     

                          TOXIC EFFECT OF VENOM OF BROWN RECLUSE S                       

 

                10/11/2017              EVA VIDAL MD              Ot              T65.222A     

                          TOXIC EFFECT OF TOBACCO CIGARETTES, SELF                       

 

                10/20/2017              EVA VIDAL MD, Ot              L98.492      

                          NON-PRS CHRONIC ULCER OF SKIN OF SITES W                       

 

                10/20/2017              EVA VIDAL MD, Ot              T63.331A     

                          TOXIC EFFECT OF VENOM OF BROWN RECLUSE S                       

 

                10/20/2017              EVA VIDAL MD, Ot              T65.222A     

                          TOXIC EFFECT OF TOBACCO CIGARETTES, SELF                       

 

                2017              EVA VIDAL MD, Ot              B18.9        

                          CHRONIC VIRAL HEPATITIS, UNSPECIFIED                       

 

                2017              EVA VIDAL MD, Ot              L98.491      

                          NON-PRS CHRONIC ULCER SKIN/ SITES LIMITE                       

 

                2017              EVA VIDAL MD, Ot              L98.492      

                          NON-PRS CHRONIC ULCER OF SKIN OF SITES W                       

 

                2017              EVA VIDAL MD, Ot              S51.801A     

                          UNSPECIFIED OPEN WOUND OF RIGHT FOREARM,                       

 

                2017              EVA VIDAL MD, Ot              T63.331S     

                          TOXIC EFFECT OF VENOM OF BROWN RECLUSE S                       

 

                2017              EVA VIDAL MD, Ot              T65.222A     

                          TOXIC EFFECT OF TOBACCO CIGARETTES, SELF                       

 

                2017              EVA VIDAL MD, Ot              B18.9        

                          CHRONIC VIRAL HEPATITIS, UNSPECIFIED                       

 

                2017              EVA VIDAL MD, Ot              L98.491      

                          NON-PRS CHRONIC ULCER SKIN/ SITES LIMITE                       

 

                2017              EVA VIDAL MD, Ot              L98.492      

                          NON-PRS CHRONIC ULCER OF SKIN OF SITES W                       

 

                2017              EVA VIDAL MD, Ot              S51.801A     

                          UNSPECIFIED OPEN WOUND OF RIGHT FOREARM,                       

 

                2017              EVA VIDAL MD, Ot              T63.331S     

                          TOXIC EFFECT OF VENOM OF BROWN RECLUSE S                       

 

                2017              EVA VIDAL MD, Ot              T65.222A     

                          TOXIC EFFECT OF TOBACCO CIGARETTES, SELF                       



                                                                                
                                                                                
                                                



Procedures

      





                Code              Description              Performed By              Performed On     

   

 

                                      00.71                                    ABILIO OF HIP REPLACEMENT, ACETABULAR

 COM                                                  2013        



                  



Results

      





                    Test                Result              Range        









                                        Complete blood count (CBC) with automated white blood cell (WBC) differential - 

17 14:33         









                          Blood leukocytes automated count (number/volume)              8.5 10*3/uL         

                                        4.3-11.0        

 

                          Blood erythrocytes automated count (number/volume)              3.68 10*6/uL      

                                        4.35-5.85        

 

                          Venous blood hemoglobin measurement (mass/volume)              12.1 g/dL          

                                        11.5-16.0        

 

                    Blood hematocrit (volume fraction)              34 %                35-52        

 

                    Automated erythrocyte mean corpuscular volume              93 [foz_us]              

80-99        

 

                                        Automated erythrocyte mean corpuscular hemoglobin (mass per erythrocyte)        

                          33 pg                     25-34        

 

                                        Automated erythrocyte mean corpuscular hemoglobin concentration measurement (mass/volume)

                          35 g/dL                   32-36        

 

                    Automated erythrocyte distribution width ratio              11.9 %              10.0-

14.5        

 

                    Automated blood platelet count (count/volume)              238 10*3/uL              

130-400        

 

                          Automated blood platelet mean volume measurement              11.0 [foz_us]       

                                        7.4-10.4        

 

                    Automated blood neutrophils/100 leukocytes              66 %                42-75     

   

 

                    Automated blood lymphocytes/100 leukocytes              26 %                12-44     

   

 

                    Blood monocytes/100 leukocytes              7 %                 0-12        

 

                    Automated blood eosinophils/100 leukocytes              1 %                 0-10       

 

 

                    Automated blood basophils/100 leukocytes              0 %                 0-10        

 

                          Blood neutrophils automated count (number/volume)              5.6 10*3           

                                        1.8-7.8        

 

                          Blood lymphocytes automated count (number/volume)              2.2 10*3           

                                        1.0-4.0        

 

                    Blood monocytes automated count (number/volume)              0.6 10*3              0.0-

1.0        

 

                    Automated eosinophil count              0.1 10*3/uL              0.0-0.3        

 

                    Automated blood basophil count (count/volume)              0.0 10*3/uL              

0.0-0.1        









                                        Comprehensive metabolic panel - 17 14:33         









                          Serum or plasma sodium measurement (moles/volume)              137 mmol/L         

                                        135-145        

 

                          Serum or plasma potassium measurement (moles/volume)              3.6 mmol/L      

                                        3.6-5.0        

 

                          Serum or plasma chloride measurement (moles/volume)              103 mmol/L       

                                                

 

                    Carbon dioxide              23 mmol/L              21-32        

 

                          Serum or plasma anion gap determination (moles/volume)              11 mmol/L     

                                        5-14        

 

                          Serum or plasma urea nitrogen measurement (mass/volume)              15 mg/dL     

                                        7-18        

 

                          Serum or plasma creatinine measurement (mass/volume)              1.08 mg/dL      

                                        0.60-1.30        

 

                    Serum or plasma urea nitrogen/creatinine mass ratio              14                  NRG

        

 

                                        Serum or plasma creatinine measurement with calculation of estimated glomerular 

filtration rate              53                        NRG        

 

                          Serum or plasma glucose measurement (mass/volume)              109 mg/dL          

                                                

 

                          Serum or plasma calcium measurement (mass/volume)              9.0 mg/dL          

                                        8.5-10.1        

 

                          Serum or plasma total bilirubin measurement (mass/volume)              1.7 mg/dL  

                                        0.1-1.0        

 

                                        Serum or plasma alkaline phosphatase measurement (enzymatic activity/volume)    

                          98 U/L                            

 

                                        Serum or plasma aspartate aminotransferase measurement (enzymatic activity/volume)

                          32 U/L                    5-34        

 

                                        Serum or plasma alanine aminotransferase measurement (enzymatic activity/volume)

                          17 U/L                    0-55        

 

                          Serum or plasma protein measurement (mass/volume)              6.6 g/dL           

                                        6.4-8.2        

 

                          Serum or plasma albumin measurement (mass/volume)              4.0 g/dL           

                                        3.2-4.5        



                  



Encounters

      





                ACCT No.              Visit Date/Time              Discharge              Status      

                Pt. Type              Provider              Facility              Loc./Unit      

                                        Complaint        

 

                    [id number]              10/12/2017 12:47:00              10/12/2017 23:59:59      

                CLS              [facility]              PATY BETANCOURT              [facility]              [facility]                          

 

                    [id number]              10/05/2017 13:25:00              10/05/2017 23:59:59      

                CLS              Outpatient              EVA VIDAL MD              [facility]              [facility]                          

 

                    [id number]              09/15/2017 08:07:00              09/15/2017 23:59:59      

                CLS              Outpatient              EVA VIDAL MD              [facility]                          

 

                    [id number]              2017 13:28:00              2017 16:25:00      

                DIS              Emergency              DALILA JENKINS MD              [facility]              ER                        SPIDER BITE RT BUTTOCKS        

 

                    [id number]              2014 14:02:00              2014 23:59:59      

             CLS              Outpatient                                                        

                                                 

 

                    [id number]              01/15/2014 15:43:00              01/15/2014 23:59:59      

             CLS              Outpatient                                                        

                                                 

 

                    [id number]              2013 14:35:00              2013 18:06:00      

                DIS              Emergency              TRAM WOOD MD              [facility]              ER                        FALL/RIGHT HIP PAIN        

 

                    [id number]              2013 06:01:00              2013 10:10:00      

                DIS              Inpatient              YOHANNES ZAVALETA MD              [facility]              SURGICAL                  LOOSE COMPONENT RIGHT HIP   

     

 

                    [id number]              2013 09:55:00              2013 23:59:59      

                CLS              Outpatient              REVEAL YOHANNES CARRILLO              [facility]              PREOP                     LOOSE COMPONENT RIGHT HIP      

  

 

                    [id number]              10/26/2013 14:25:00              10/28/2013 15:30:00      

                DIS              Inpatient              AUDRA CARRILLO, GHASSAN HUIZAR              [facility]              4TH                       UTI, VOMITING        

 

                    [id number]              10/22/2013 12:25:00              10/22/2013 23:59:59      

                CLS              Outpatient              REVEAL YOHANNES CARRILLO              [facility]              RAD                       ASEPTIC LOOSENING OF PROSTHETIC JOINT

        

 

                    [id number]              10/10/2013 10:50:00              10/10/2013 23:59:59      

                CLS              Outpatient              REVEAL YOHANNES CARRILLO              [facility]              LAB                       HX OF REBECA HIP REPLACEMENT        



 

                    [id number]              10/09/2013 15:38:00              10/09/2013 23:59:59      

             CLS              Outpatient                                                        

                                                 

 

                    [id number]              2013 22:01:00              2013 23:45:00      

           DIS              Emergency                                                           

         

 

                [id number]              2017 16:15:00                                          

             Document Registration

## 2019-07-30 NOTE — NUR
PT STATES SYMPTOMS HAVE IMPROVED AT THIS TIME, PT DENIES ANY NEEDS OR C/O AT 
THIS TIME, PT SHOWS NO S/S OF DISTRESS, VS ASSESSED AND STABLE, WILL CONTINUE 
TO MONITOR

## 2019-07-30 NOTE — NUR
PT DENIES ANY NEEDS OR C/O AT THIS TIME, PT HAS MOVED C COLLAR TO WHERE IT IS 
AROUND HER NOSE AND IS NO LONGER IN PROPER PLACEMENT, PHYSICIAN NOTIFIED OF 
THIS, PT SHOWS NO S/S OF DISTRESS, VS ASSESSED AND STABLE, WILL CONTINUE TO 
MONITOR

## 2019-07-30 NOTE — XMS REPORT
Continuity of Care Document

                             Created on: 2013



KATHE RIVAS

External Reference #: [id number]

: 1960

Sex: Female



Demographics







                          Address                   [address] [place]  [address]

 

                          Home Phone                [phone]

 

                          Preferred Language        Unknown

 

                          Marital Status            Unknown

 

                          Sabianist Affiliation     Unknown

 

                          Race                      Unknown

 

                          Ethnic Group              Unknown





Author







                          Author                    [facility]

 

                          Organization              [facility]

 

                          Address                   Unknown

 

                          Phone                     Unavailable







Support







                Name            Relationship    Address         Phone

 

                    WENDY URBANO      Next Of Kin         [address] [place]  [address] [phone]







Care Team Providers







                    Care Team Member Name    Role                Phone

 

                    NO, LOCAL PHYSICIAN    PP                  Unavailable



                                            



Insurance Providers

                      





                    Payer Name                      Policy Number                      Subscriber Name  

                                        Relationship                

 

                    [employer]                      [id number]                      Kathe Rivas       

                                        01 Self / Same As Patient                



                                                                    



Advance Directives

                      





                    Directive                      Response                      Recorded Date          

      

 

                    Advance Directives                      N                      13 10:06pm     

           

 

                    Health Care Power of                       N                      13 10:06pm

                

 

                    Organ Donor                      N                      13 10:06pm            

    



                                                                                
       



Problems

          No Known Problems or Medical conditions.                              
                                     



Family History

                      





                    History                      Response                      Recorded Date/Time       

         

 

                    Hx Family Cancer                      Y KIDNEY- MOTHER                      09/15/10

 8:00pm                

 

                    Hx Family Cardiac Disorders                      Y                      09/15/10 8:00pm

                

 

                    Hx Family Stroke                      Y MOTHER                      09/15/10 8:00pm 

               

 

                    Hx Family Hypertension                      Y PARENTS                      09/15/10 

8:00pm                

 

                          Hx Family Myocardial Infarction                      Y FATAL- FATHER              

                                        09/15/10 8:00pm                



                                                                    



Social History

                      





                    History                      Response                      Recorded Date/Time       

         

 

                    Alcohol Use                      Denies Use                      13 10:06pm   

             

 

                    Recreational Drug Use                      N                      13 10:06pm  

              



                                                                                
       



Allergies, Adverse Reactions, Alerts

                      





                Allergen                      Type                      Severity                      

Reaction                                Last Updated                

 

                Oxycodone                      Allergy                      Mild                      

hives, nausea                           07/09/10                

 

                    Propoxyphene                      Allergy                      Mild                 

                                                    04/29/10                



                                                                                
                           



Medications

                      





                Medication                      Dose                      Units                      Route

                      Sig                      Qty                      Days           

     

 

                    Tramadol HCl (Ultram)                      50                      Mg               

                    PO                      Q4-6HOURS PRN                      20                   

                                                         

 

                    Naproxen (Naprosyn)                      1                      Each                

                PO                      TID PRN                      20                            

           

 

                                        Acetaminophen/Hydrocodone Bitart (Hydrocodone-Apap 5-500 Cap)                   

                1                      Each                      PO                      Q6HR PRN     

                                                                     

 

                    Carisoprodol (Soma)                      1                      Tab                 

                PO                      HS                                                          

    

 

                Diazepam                      10                      Mg                      PO      

                    TID-QID PRN                                                            

  

 

                    Naproxen (Naprosyn)                      1                      Each                

                PO                      TID PRN                      20                            

           

 

                          Trimethoprim/Sulfamethoxazole (Bactrim Ds Tablet)                      1          

                Each                      PO                      BID                      30

                                                         

 

                    Tramadol HCl (Ultram)                      50                      Mg               

                PO                      Q6H PRN                      20                           

            

 

                    Ciprofloxacin (Cipro)                      1                      Tab               

                PO                      BID                                             7         

       



                                                                                
                                                                             



Pregnancy

                      





                          Response                      Recorded Date/Time                

 

                          Status not known                      Unknown                



                                                                              



Results

                      





                Test                      Date                      Result                      Interp.

                                        Ref. Range                

 

                          Acetaminophen Level                      2009 8:17am                  

                    < 10  UG/ML                      L                      10.0-30.0                

 

                          Acetaminophen Screen                      2009 9:05am                 

                    POSITIVE                      H                      -                

 

                          Activated Partial Thromboplast Time                      2009 8:17am  

                    36  SEC                      H                      24-35          

      

 

                          Alanine Aminotransferase (ALT/SGPT)                      September 15, 2010 5:10pm

                      31  U/L                      N                      30-65        

        

 

                    Albumin                      September 15, 2010 5:10pm                      3.5  G/DL

                          N                         3.4-5.0                

 

                          Alkaline Phosphatase                      September 15, 2010 5:10pm               

                    138  U/L                      H                                      

 

                    Amphetamines Screen                      July 15, 2006 2:05am                      Negative

                                                    -                

 

                    Amylase Level                      September 15, 2010 5:10pm                      41

  U/L                      N                                         

 

                          Anti-Neutrophil Cytoplasmic Ab                      2009 11:04am        

                    <1:20                                             -                

 

                          Aspartate Amino Transf (AST/SGOT)                      September 15, 2010 5:10pm  

                    13  U/L                      L                      15-37          

      

 

                          BUN/Creatinine Ratio                      September 15, 2010 5:10pm               

                    13                                             -                

 

                    Band Neutrophils                      October 10, 2009 6:29am                      0

  %                                                 -                

 

                          Basophils # (Auto)                      September 15, 2010 5:10pm                 

                    0.0  10^3/uL                      N                      0.0-0.1                

 

                          Basophils % (Manual)                      October 10, 2009 6:29am                 

                    0  %                                             -                

 

                          Basophils (%) (Auto)                      September 15, 2010 5:10pm               

                    0  %                      N                      0-10                

 

                    Bleeding Time                      2010 2:15pm                      6.0  MIN

                          N                         2.5-9.5                

 

                          Blood Urea Nitrogen                      September 15, 2010 5:10pm                

                    8  MG/DL                      N                      7-18                

 

                    Calcium Level                      September 15, 2010 5:10pm                      8.4

  MG/DL                      L                         8.5-10.1                

 

                          Carbon Dioxide Level                      September 15, 2010 5:10pm               

                    30  MMOL/L                      N                      21-32                

 

                    Chloride Level                      September 15, 2010 5:10pm                      103

  MMOL/L                      N                         101-110                

 

                    Cocaine Screen                      July 15, 2006 2:05am                      Negative

                                                    -                

 

                    Creatinine                      September 15, 2010 5:10pm                      0.6  

MG/DL                      N                         0.6-1.3                

 

                          Eosinophils # (Auto)                      September 15, 2010 5:10pm               

                    0.1  10^3/uL                      N                      0.0-0.3                



 

                          Eosinophils % (Manual)                      October 10, 2009 6:29am               

                    3  %                                             -                

 

                          Eosinophils (%) (Auto)                      September 15, 2010 5:10pm             

                    1  %                      N                      0-10                

 

                          Erythrocyte Sedimentation Rate                      2009 2:45pm         

                    21  MM/HR                      H                      0-20                



 

                          Gastric Fluid Occult Blood                      2006 3:15pm             

                    Neg                                             -                

 

                    Glucose Level                      September 15, 2010 5:10pm                      93

  MG/DL                      N                                         

 

                    Hematocrit                      September 15, 2010 5:10pm                      36  %

                          N                         35-52                

 

                    Hemoglobin                      September 15, 2010 5:10pm                      12.8 

 G/DL                      N                         11.5-16.0                

 

                    Lipase                      2009 6:46am                      191  U/L     

                          N                         114-286                

 

                          Lymphocytes # (Auto)                      September 15, 2010 5:10pm               

                    3.2  X 10^3                      N                      1.0-4.0                

 

                          Lymphocytes % (Manual)                      October 10, 2009 6:29am               

                    41  %                                             -                

 

                          Lymphocytes (%) (Auto)                      September 15, 2010 5:10pm             

                    24  %                      N                      12-44                

 

                    Magnesium Level                      May 15, 2010 10:12am                      2.0  

MG/DL                      N                         1.8-2.4                

 

                          Marijuana (THC) Screen                      July 15, 2006 2:05am                  

                    Negative                                             -                

 

                          Mean Corpuscular Hemoglobin                      September 15, 2010 5:10pm        

                    35  PG                      H                      25-34                

 

                          Mean Corpuscular Hemoglobin Concent                      September 15, 2010 5:10pm

                      36  G/DL                      N                      32-36       

         

 

                          Mean Corpuscular Volume                      September 15, 2010 5:10pm            

                    98  FL                      N                      80-99                

 

                          Mean Platelet Volume                      September 15, 2010 5:10pm               

                    9.5  FL                      N                      7.4-10.4                

 

                          Monocytes # (Auto)                      September 15, 2010 5:10pm                 

                    0.6  X 10^3                      N                      0.0-1.0                

 

                          Monocytes % (Manual)                      October 10, 2009 6:29am                 

                    11  %                                             -                

 

                          Monocytes (%) (Auto)                      September 15, 2010 5:10pm               

                    4  %                      N                      0-12                

 

                    Myoglobin                      September 15, 2010 5:10pm                      22  UG/L

                          N                         10-92                

 

                          Neutrophils # (Auto)                      September 15, 2010 5:10pm               

                    9.3  X 10^3                      H                      1.8-7.8                

 

                          Neutrophils % (Manual)                      October 10, 2009 6:29am               

                    38  %                                             -                

 

                          Neutrophils (%) (Auto)                      September 15, 2010 5:10pm             

                    70  %                      N                      42-75                

 

                    Opiates Screen                      July 15, 2006 2:05am                      Positive

                                                    -                

 

                    Platelet Count                      September 15, 2010 5:10pm                      223

  10^3/uL                      N                         130-400                

 

                    Potassium Level                      September 15, 2010 5:10pm                      

3.5  MMOL/L                      L                         3.6-5.0                

 

                          Prothromb Time International Ratio                      May 31, 2010 6:40am       

                    1.1                      N                      0.8-1.4                

 

                    Prothrombin Time                      May 31, 2010 6:40am                      14.4 

 SEC                      N                         12.2-14.7                

 

                          Reactive Lymphocytes                      October 10, 2009 6:29am                 

                    7  %                                             -                

 

                    Red Blood Count                      September 15, 2010 5:10pm                      

3.66  10^6/uL                      L                         4.35-5.85                

 

                          Red Cell Distribution Width                      September 15, 2010 5:10pm        

                    13.7  %                      N                      10.0-14.5            

    

 

                          Saccharomyces cerevisiae IgA Ab                      2009 11:04am       

                    31.8 H  U                                             -                

 

                          Saccharomyces cerevisiae IgG Ab                      2009 11:04am       

                    18.3  U                                             -                

 

                    Sodium Level                      September 15, 2010 5:10pm                      138

  MMOL/L                      N                         135-145                

 

                    Total Bilirubin                      September 15, 2010 5:10pm                      

0.3  MG/DL                      N                         0.0-1.0                

 

                    Total Protein                      September 15, 2010 5:10pm                      6.9

  G/DL                      N                         6.4-8.2                

 

                          Tricyclic Antidepressants Screen                      July 15, 2006 2:05am        

                    Negative                                             -                

 

                    Troponin I                      September 15, 2010 11:24pm                      < 0.10

  MG/ML                                                0.00-0.10                

 

                          Ur Tricyclic Antidepressants Screen                      2009 9:05am  

                    NEGATIVE                                             -             

   

 

                          Urine Amphetamines Screen                      2009 9:05am            

                    POSITIVE                      H                      -                

 

                    Urine Bacteria                      May 27, 2010 9:10am                      MODERATE

                          H                         -                

 

                          Urine Barbiturates Screen                      2009 9:05am            

                    NEGATIVE                                             -                

 

                          Urine Benzodiazepines Screen                      2009 9:05am         

                    POSITIVE                      H                      -                

 

                    Urine Bilirubin                      May 27, 2010 9:10am                      NEGATIVE

                                                    -                

 

                          Urine Calcium Oxalate Crystals                      2010 1:35pm         

                    RARE                      H                      -                

 

                    Urine Casts                      May 27, 2010 9:10am                      NONE      

                                                    -                

 

                    Urine Clarity                      May 27, 2010 9:10am                      SLIGHTLY

 CLOUDY                                                -                

 

                          Urine Cocaine Screen                      2009 9:05am                 

                    NEGATIVE                                             -                

 

                    Urine Color                      May 27, 2010 9:10am                      YELLOW    

                                                    -                

 

                    Urine Crystals                      May 27, 2010 9:10am                      NONE   

                                                    -                

 

                          Urine Culture Indicated                      May 27, 2010 9:10am                  

                    YES                                             -                

 

                    Urine Glucose (UA)                      May 27, 2010 9:10am                      NEGATIVE

                                                    -                

 

                    Urine Ketones                      May 27, 2010 9:10am                      NEGATIVE

                                                    -                

 

                          Urine Leukocyte Esterase                      May 27, 2010 9:10am                 

                    2+                      H                      -                

 

                          Urine Methamphetamines Screen                      2009 9:05am        

                    NEGATIVE                                             -                

 

                    Urine Mucus                      May 27, 2010 9:10am                      NEGATIVE  

                                                    -                

 

                    Urine Nitrite                      May 27, 2010 9:10am                      NEGATIVE

                                                    -                

 

                          Urine Opiates Screen                      2009 9:05am                 

                    POSITIVE                      H                      -                

 

                          Urine Phencyclidine Screen                      2009 9:05am           

                    NEGATIVE                                             -                

 

                          Urine Pregnancy Test                      2009 12:48am                  

                    NEGATIVE                                             -                

 

                    Urine Protein                      May 27, 2010 9:10am                      NEGATIVE

                                                    -                

 

                    Urine RBC                      May 27, 2010 9:10am                      NONE  /HPF  

                                                    -                

 

                          Urine Specific Gravity                      May 27, 2010 9:10am                   

                    1.005                      L                      -                

 

                          Urine Squamous Epithelial Cells                      May 27, 2010 9:10am          

                    10-25                      H                      -                

 

                    Urine Urobilinogen                      May 27, 2010 9:10am                      NORMAL

  MG/DL                                                -                

 

                    Urine WBC                      May 27, 2010 9:10am                      10-25  /HPF 

                          H                         -                

 

                    Urine pH                      May 27, 2010 9:10am                      7.0          

                                                    -                

 

                          Vancomycin Level Trough                      May 16, 2010 1:12am                  

                    6.6  MCG/ML                      L                      10-20                

 

                          White Blood Count                      September 15, 2010 5:10pm                  

                    13.3  10^3/uL                      H                      4.3-11.0                

 

                    Barbiturate Screen                      July 15, 2006 2:05am                      Negative

                                                    -                

 

                    Serum Alcohol                      2009 8:17am                      < 5 

 MG/DL                                                -5                

 

                          Lab Scanned Report                      September 15, 2010 8:00pm                 

                    Referred Lab Report  [id number]                                             -        

        

 

                          Estimat Glomerular Filtration Rate                      September 15, 2010 5:10pm 

                     > 60                                             -                



 

                          Blood Morphology Comment                      October 10, 2009 6:29am             

                    NORMAL                                             -                

 

                          Urine Methadone Screen                      2009 9:05am               

                    POSITIVE                      H                      -                

 

                          Creatine Kinase                      September 15, 2010 11:24pm                   

                    96  mg/dl                      N                                      

 

                          Urine Cannabinoids Screen                      2009 9:05am            

                    NEGATIVE                                             -                

 

                          Cardiac Panel Pathologist Review                      September 15, 2010 5:10pm   

                    SEE CARDIAC PATH REV                                             -  

              

 

                          Stool Occult Blood Immunoassay                      2009 9:40am         

                    NEGATIVE                                             -                

 

                    Urine RBC (Auto)                      May 27, 2010 9:10am                      NEGATIVE

                                                    -                



                                                                                
                                                                                
                                                                                
                                                                                
                                                                                
                                                                                
                                                                                
                                                                                
                                                                                
                                                                                
                                                                                
                                                                                
                                                                                
                                                       



Procedures

                      





                    Procedure                      Code                      Date                

 

                    COLONOSCOPY                      45.23                      09                



 

                    TOTAL HIP REPLACEMENT                      81.51                      05/24/10      

          

 

                    TOTAL HIP REPLACEMENT                      81.51                      05/11/10      

          

 

                    VENOUS CATHETERIZATION NEC                      38.93                      05/16/10 

               

 

                    Blood Culture                                             05/14/10                

 

                    Ova and Parasites                                             09              

  

 

                    MRSA Screen                                             05/23/10                

 

                    Urine Culture                                             05/27/10                

 

                    Gram Stain                                             08                



                                                                                
                                                                             



Encounters

                      





                    Encounter                      Location                      Date/Time              

  

 

                    Departed Emergency Room                      [facility]                      13

 10:01pm                

 

                    Discharged Inpatient                      [facility]                      09/15/10

 8:00pm                

 

                    Pre-admitted Inpatient                      [facility]                      05/04/10

## 2020-01-01 ENCOUNTER — HOSPITAL ENCOUNTER (OUTPATIENT)
Dept: HOSPITAL 75 - ER | Age: 60
Setting detail: OBSERVATION
LOS: 1 days | Discharge: LEFT BEFORE DISCHARGE | End: 2020-01-02
Attending: FAMILY MEDICINE | Admitting: FAMILY MEDICINE
Payer: MEDICARE

## 2020-01-01 VITALS — SYSTOLIC BLOOD PRESSURE: 132 MMHG | DIASTOLIC BLOOD PRESSURE: 70 MMHG

## 2020-01-01 VITALS — DIASTOLIC BLOOD PRESSURE: 74 MMHG | SYSTOLIC BLOOD PRESSURE: 122 MMHG

## 2020-01-01 VITALS — DIASTOLIC BLOOD PRESSURE: 76 MMHG | SYSTOLIC BLOOD PRESSURE: 120 MMHG

## 2020-01-01 VITALS — DIASTOLIC BLOOD PRESSURE: 75 MMHG | SYSTOLIC BLOOD PRESSURE: 111 MMHG

## 2020-01-01 VITALS — SYSTOLIC BLOOD PRESSURE: 122 MMHG | DIASTOLIC BLOOD PRESSURE: 76 MMHG

## 2020-01-01 VITALS — DIASTOLIC BLOOD PRESSURE: 66 MMHG | SYSTOLIC BLOOD PRESSURE: 131 MMHG

## 2020-01-01 VITALS — DIASTOLIC BLOOD PRESSURE: 107 MMHG | SYSTOLIC BLOOD PRESSURE: 135 MMHG

## 2020-01-01 VITALS — HEIGHT: 67.72 IN | BODY MASS INDEX: 21.13 KG/M2 | WEIGHT: 137.79 LBS

## 2020-01-01 VITALS — SYSTOLIC BLOOD PRESSURE: 135 MMHG | DIASTOLIC BLOOD PRESSURE: 76 MMHG

## 2020-01-01 VITALS — DIASTOLIC BLOOD PRESSURE: 71 MMHG | SYSTOLIC BLOOD PRESSURE: 147 MMHG

## 2020-01-01 VITALS — DIASTOLIC BLOOD PRESSURE: 80 MMHG | SYSTOLIC BLOOD PRESSURE: 132 MMHG

## 2020-01-01 DIAGNOSIS — F17.210: ICD-10-CM

## 2020-01-01 DIAGNOSIS — Z82.3: ICD-10-CM
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
## 2020-01-01 DIAGNOSIS — Z79.82: ICD-10-CM

## 2020-01-01 DIAGNOSIS — Z91.048: ICD-10-CM

## 2020-01-01 DIAGNOSIS — Z91.041: ICD-10-CM

## 2020-01-01 DIAGNOSIS — Z79.899: ICD-10-CM

## 2020-01-01 DIAGNOSIS — Z79.891: ICD-10-CM

## 2020-01-01 DIAGNOSIS — R07.9: Primary | ICD-10-CM

## 2020-01-01 DIAGNOSIS — J44.9: ICD-10-CM

## 2020-01-01 DIAGNOSIS — F32.9: ICD-10-CM

## 2020-01-01 DIAGNOSIS — Z90.710: ICD-10-CM

## 2020-01-01 DIAGNOSIS — M19.90: ICD-10-CM

## 2020-01-01 DIAGNOSIS — F15.20: ICD-10-CM

## 2020-01-01 DIAGNOSIS — G47.9: ICD-10-CM

## 2020-01-01 DIAGNOSIS — F41.9: ICD-10-CM

## 2020-01-01 DIAGNOSIS — Z82.49: ICD-10-CM

## 2020-01-01 DIAGNOSIS — K21.9: ICD-10-CM

## 2020-01-01 DIAGNOSIS — Z88.8: ICD-10-CM

## 2020-01-01 DIAGNOSIS — Z90.89: ICD-10-CM

## 2020-01-01 LAB
ALBUMIN SERPL-MCNC: 4.5 GM/DL (ref 3.2–4.5)
ALP SERPL-CCNC: 140 U/L (ref 40–136)
ALT SERPL-CCNC: 16 U/L (ref 0–55)
APTT BLD: 24 SEC (ref 24–35)
BARBITURATES UR QL: NEGATIVE
BASOPHILS # BLD AUTO: 0 10^3/UL (ref 0–0.1)
BASOPHILS NFR BLD AUTO: 1 % (ref 0–10)
BENZODIAZ UR QL SCN: POSITIVE
BILIRUB SERPL-MCNC: 0.6 MG/DL (ref 0.1–1)
BUN/CREAT SERPL: 17
CALCIUM SERPL-MCNC: 9.4 MG/DL (ref 8.5–10.1)
CHLORIDE SERPL-SCNC: 108 MMOL/L (ref 98–107)
CO2 SERPL-SCNC: 17 MMOL/L (ref 21–32)
COCAINE UR QL: NEGATIVE
CREAT SERPL-MCNC: 1.37 MG/DL (ref 0.6–1.3)
EOSINOPHIL # BLD AUTO: 0.3 10^3/UL (ref 0–0.3)
EOSINOPHIL NFR BLD AUTO: 4 % (ref 0–10)
ERYTHROCYTE [DISTWIDTH] IN BLOOD BY AUTOMATED COUNT: 12.8 % (ref 10–14.5)
GFR SERPLBLD BASED ON 1.73 SQ M-ARVRAT: 40 ML/MIN
GLUCOSE SERPL-MCNC: 89 MG/DL (ref 70–105)
HCT VFR BLD CALC: 36 % (ref 35–52)
HGB BLD-MCNC: 12.8 G/DL (ref 11.5–16)
INR PPP: 1 (ref 0.8–1.4)
LYMPHOCYTES # BLD AUTO: 2.9 X 10^3 (ref 1–4)
LYMPHOCYTES NFR BLD AUTO: 33 % (ref 12–44)
MAGNESIUM SERPL-MCNC: 1.9 MG/DL (ref 1.6–2.4)
MANUAL DIFFERENTIAL PERFORMED BLD QL: NO
MCH RBC QN AUTO: 32 PG (ref 25–34)
MCHC RBC AUTO-ENTMCNC: 35 G/DL (ref 32–36)
MCV RBC AUTO: 92 FL (ref 80–99)
METHADONE UR QL SCN: NEGATIVE
METHAMPHETAMINE SCREEN URINE S: POSITIVE
MONOCYTES # BLD AUTO: 0.8 X 10^3 (ref 0–1)
MONOCYTES NFR BLD AUTO: 9 % (ref 0–12)
NEUTROPHILS # BLD AUTO: 4.8 X 10^3 (ref 1.8–7.8)
NEUTROPHILS NFR BLD AUTO: 55 % (ref 42–75)
OPIATES UR QL SCN: POSITIVE
OXYCODONE UR QL: POSITIVE
PLATELET # BLD: 228 10^3/UL (ref 130–400)
PMV BLD AUTO: 9.7 FL (ref 7.4–10.4)
POTASSIUM SERPL-SCNC: 4.3 MMOL/L (ref 3.6–5)
PROPOXYPH UR QL: NEGATIVE
PROT SERPL-MCNC: 7.6 GM/DL (ref 6.4–8.2)
PROTHROMBIN TIME: 13.8 SEC (ref 12.2–14.7)
SODIUM SERPL-SCNC: 139 MMOL/L (ref 135–145)
TRICYCLICS UR QL SCN: NEGATIVE
WBC # BLD AUTO: 8.8 10^3/UL (ref 4.3–11)

## 2020-01-01 PROCEDURE — 96372 THER/PROPH/DIAG INJ SC/IM: CPT

## 2020-01-01 PROCEDURE — 84484 ASSAY OF TROPONIN QUANT: CPT

## 2020-01-01 PROCEDURE — 96374 THER/PROPH/DIAG INJ IV PUSH: CPT

## 2020-01-01 PROCEDURE — 71045 X-RAY EXAM CHEST 1 VIEW: CPT

## 2020-01-01 PROCEDURE — 93041 RHYTHM ECG TRACING: CPT

## 2020-01-01 PROCEDURE — 74176 CT ABD & PELVIS W/O CONTRAST: CPT

## 2020-01-01 PROCEDURE — 83874 ASSAY OF MYOGLOBIN: CPT

## 2020-01-01 PROCEDURE — 85379 FIBRIN DEGRADATION QUANT: CPT

## 2020-01-01 PROCEDURE — 85025 COMPLETE CBC W/AUTO DIFF WBC: CPT

## 2020-01-01 PROCEDURE — 80320 DRUG SCREEN QUANTALCOHOLS: CPT

## 2020-01-01 PROCEDURE — 36415 COLL VENOUS BLD VENIPUNCTURE: CPT

## 2020-01-01 PROCEDURE — 71250 CT THORAX DX C-: CPT

## 2020-01-01 PROCEDURE — 93005 ELECTROCARDIOGRAM TRACING: CPT

## 2020-01-01 PROCEDURE — 85730 THROMBOPLASTIN TIME PARTIAL: CPT

## 2020-01-01 PROCEDURE — 96361 HYDRATE IV INFUSION ADD-ON: CPT

## 2020-01-01 PROCEDURE — 80053 COMPREHEN METABOLIC PANEL: CPT

## 2020-01-01 PROCEDURE — 80306 DRUG TEST PRSMV INSTRMNT: CPT

## 2020-01-01 PROCEDURE — 85610 PROTHROMBIN TIME: CPT

## 2020-01-01 PROCEDURE — 83735 ASSAY OF MAGNESIUM: CPT

## 2020-01-01 RX ADMIN — SODIUM CHLORIDE SCH MLS/HR: 900 INJECTION, SOLUTION INTRAVENOUS at 16:00

## 2020-01-01 RX ADMIN — SODIUM CHLORIDE SCH MLS/HR: 900 INJECTION, SOLUTION INTRAVENOUS at 22:09

## 2020-01-01 RX ADMIN — NITROGLYCERIN PRN MG: 0.4 TABLET SUBLINGUAL at 14:00

## 2020-01-01 RX ADMIN — MORPHINE SULFATE PRN MG: 4 INJECTION, SOLUTION INTRAMUSCULAR; INTRAVENOUS at 21:24

## 2020-01-01 RX ADMIN — NITROGLYCERIN PRN MG: 0.4 TABLET SUBLINGUAL at 17:42

## 2020-01-01 RX ADMIN — NITROGLYCERIN PRN MG: 0.4 TABLET SUBLINGUAL at 14:06

## 2020-01-01 RX ADMIN — MORPHINE SULFATE PRN MG: 4 INJECTION, SOLUTION INTRAMUSCULAR; INTRAVENOUS at 17:52

## 2020-01-01 NOTE — ED CHEST PAIN
General


Chief Complaint:  Chest Pain


Stated Complaint:  CHEST PAIN


Nursing Triage Note:  


PT TO TRIAGE CO OF CHEST PAIN, PT STATES HAS HAD SINCE , STATES FELL ON




CHEST BONE FEW DAYS AGO, PT EYES ARE VERY RED AND WATERING, PT IS UNSTEADY ON 


FEET AND SLOW TO ANSWER QUESTIONS. PT IS ALERT AND ORIENTATED. PT STATES TOOK 


VALIUM YESTERDAY FROM FRIEND


Nursing Sepsis Screen:  No Definite Risk


Source:  patient


Exam Limitations:  no limitations





History of Present Illness


Date Seen by Provider:  2020


Time Seen by Provider:  10:52


Initial Comments


This 59-year-old woman presents to the emergency room with primary complaint of 

chest pain.  She states this started on [place] when she fell in the attic

and struck her chest.  However, she also describes dyspnea and worsening pain on

exertion.  She has history of smoking and states she was supposed to follow-up 

with the cardiologist after a prior healthcare encounters but never did.  She 

denies any drug or alcohol use.  She also has had some cough and congestion 

recently for which she has taken Benadryl and NyQuil.  She also admits to using 

a friend's Valium yesterday.  Patient's son had stated patient may have some dem

entia when he was conversing with the .  However, the son then 

left.  She has no primary care provider.  She last saw Dr. Chisholm over a year 

ago.





Allergies and Home Medications


Allergies


Coded Allergies:  


     propoxyphene (Unverified  Allergy, Mild, 4/29/10)


     adhesive (Unverified  Allergy, Unknown, RASH, 17)


     Iodinated Contrast Media (Verified  Adverse Reaction, Intermediate, NAUSEA,

10/26/13)


   


   SEVERE VOMITTING





Home Medications


Aspirin 325 Mg Tabec, 325 MG PO DAILY, (Reported)


Ipratropium Bromide 12.9 Gm Aers, 2 PUFF IH Q6H


   Prescribed by: SWAPNA MAIER on 19 1334


Ondansetron 4 Mg Tab.rapdis, 4 MG PO Q6H PRN for NAUSEA/VOMITING-1ST LINE


   Prescribed by: DALILA JENKINS on 17 1622


Oxycodone Hcl/Acetaminophen 1 Each Tablet, 1 EACH PO Q4-6H PRN, (Reported)





Patient Home Medication List


Home Medication List Reviewed:  Yes





Review of Systems


Review of Systems


Constitutional:  no symptoms reported


EENTM:  No Symptoms Reported


Respiratory:  See HPI


Cardiovascular:  See HPI


Gastrointestinal:  No Symptoms Reported


Genitourinary:  No Symptoms Reported


Musculoskeletal:  see HPI


Skin:  no symptoms reported


Psychiatric/Neurological:  No Symptoms Reported


Endocrine:  No Symptoms Reported


Hematologic/Lymphatic:  No Symptoms Reported





Past Medical-Social-Family Hx


Patient Social History


Alcohol Use:  Denies Use


Recreational Drug Use:  No


Smoking Status:  Current Everyday Smoker


Type Used:  Cigarettes


2nd Hand Smoke Exposure:  Yes


Recent Foreign Travel:  No


Contact w/Someone Who Travel:  No


Recent Infectious Disease Expo:  No


Recent Hopitalizations:  No (PNEUMONIA, KIDNEY INFECTIONS, SURGERIES)


Physical Abuse:  No


Sexual Abuse:  No





Immunizations Up To Date


Tetanus Booster (TDap):  Unknown


Date of Pneumonia Vaccine:  2009





Seasonal Allergies


Seasonal Allergies:  Yes ("SINUS PROBLEMS")





Past Medical History


Surgeries:  Yes (HYST,APPY,SAMARIA, COCCYX BONE, R HIP)


Appendectomy, Gallbladder, Hysterectomy, Orthopedic


Respiratory:  Yes


COPD


Cardiac:  Yes (WPW, tachycardia)


Neurological:  No


Pregnant:  No


Reproductive Disorders:  No


GYN History:  Hysterectomy


Sexually Transmitted Disease:  No


HIV/AIDS:  No


Kidney Infection, Kidney Stones


Gastrointestinal:  Yes


Gastroesophageal Reflux, Ulcer


Musculoskeletal:  Yes


Arthritis


Endocrine:  No


Cataract


Loss of Vision:  Denies


Hearing Impairment:  Denies


Cancer:  No


Psychosocial:  No


Sleep Difficulties, Anxiety, Depression


Integumentary:  No (BLISTERS FROM RECENT TAPE)


Blood Disorders:  No


Adverse Reaction/Blood Tranf:  No





Family Medical History


Reviewed Nursing Family Hx





Cancer


  03 MOTHER (SPINAL AND KIDNEY)


Cataract


  03 MOTHER


Congestive heart failure


  03 FATHER


Family history: Gastrointestinal disease


  09 BROTHER


Family history: Hypertension


  03 FATHER


  03 MOTHER


  09 BROTHER


Myocardial infarction


  03 FATHER


  09 BROTHER


Parkinson's disease


  03 MOTHER


Stroke


  03 MOTHER


  09 BROTHER (TIA)


  09 SISTER (TIA)


No Pertinent Family Hx





Physical Exam


Vital Signs





Vital Signs - First Documented








 20





 10:45


 


Temp 36.6


 


Pulse 105


 


Resp 18


 


B/P (MAP) 116/87 (97)


 


Pulse Ox 99


 


O2 Delivery Room Air





Capillary Refill : Less Than 3 Seconds


Height, Weight, BMI


Height: 5'5.00"


Weight: 154lbs. oz. 69.982896gs; 23.00 BMI


Method:Stated


General Appearance:  No Apparent Distress, WD/WN


HEENT:  PERRL/EOMI, Normal ENT Inspection


Neck:  Normal Inspection


Respiratory:  Lungs Clear, Normal Breath Sounds, No Accessory Muscle Use, No 

Respiratory Distress, Other (anterior chest tender to palpation)


Cardiovascular:  Regular Rate, Rhythm, No Edema, No Murmur, Normal Peripheral 

Pulses


Gastrointestinal:  Normal Bowel Sounds, Soft, Tenderness (mild, generalized)


Extremity:  Normal Inspection, No Pedal Edema


Neurologic/Psychiatric:  Oriented x3, No Motor/Sensory Deficits, Normal 

Mood/Affect, CNs II-XII Norm as Tested


Skin:  Normal Color, Warm/Dry





Progress/Results/Core Measures


Results/Orders


Lab Results





Laboratory Tests








Test


 20


11:25 20


12:00 Range/Units


 


 


White Blood Count


 8.8 


 


 4.3-11.0


10^3/uL


 


Red Blood Count


 3.95 L


 


 4.35-5.85


10^6/uL


 


Hemoglobin 12.8   11.5-16.0  G/DL


 


Hematocrit 36   35-52  %


 


Mean Corpuscular Volume 92   80-99  FL


 


Mean Corpuscular Hemoglobin 32   25-34  PG


 


Mean Corpuscular Hemoglobin


Concent 35 


 


 32-36  G/DL





 


Red Cell Distribution Width 12.8   10.0-14.5  %


 


Platelet Count


 228 


 


 130-400


10^3/uL


 


Mean Platelet Volume 9.7   7.4-10.4  FL


 


Neutrophils (%) (Auto) 55   42-75  %


 


Lymphocytes (%) (Auto) 33   12-44  %


 


Monocytes (%) (Auto) 9   0-12  %


 


Eosinophils (%) (Auto) 4   0-10  %


 


Basophils (%) (Auto) 1   0-10  %


 


Neutrophils # (Auto) 4.8   1.8-7.8  X 10^3


 


Lymphocytes # (Auto) 2.9   1.0-4.0  X 10^3


 


Monocytes # (Auto) 0.8   0.0-1.0  X 10^3


 


Eosinophils # (Auto)


 0.3 


 


 0.0-0.3


10^3/uL


 


Basophils # (Auto)


 0.0 


 


 0.0-0.1


10^3/uL


 


Prothrombin Time 13.8   12.2-14.7  SEC


 


INR Comment 1.0   0.8-1.4  


 


Activated Partial


Thromboplast Time 24 


 


 24-35  SEC





 


D-Dimer


 0.77 H


 


 0.00-0.49


UG/ML


 


Sodium Level 139   135-145  MMOL/L


 


Potassium Level 4.3   3.6-5.0  MMOL/L


 


Chloride Level 108 H    MMOL/L


 


Carbon Dioxide Level 17 L  21-32  MMOL/L


 


Anion Gap 14   5-14  MMOL/L


 


Blood Urea Nitrogen 23 H  7-18  MG/DL


 


Creatinine


 1.37 H


 


 0.60-1.30


MG/DL


 


Estimat Glomerular Filtration


Rate 40 


 


  





 


BUN/Creatinine Ratio 17    


 


Glucose Level 89     MG/DL


 


Calcium Level 9.4   8.5-10.1  MG/DL


 


Corrected Calcium 9.0   8.5-10.1  MG/DL


 


Magnesium Level 1.9   1.6-2.4  MG/DL


 


Total Bilirubin 0.6   0.1-1.0  MG/DL


 


Aspartate Amino Transf


(AST/SGOT) 28 


 


 5-34  U/L





 


Alanine Aminotransferase


(ALT/SGPT) 16 


 


 0-55  U/L





 


Alkaline Phosphatase 140 H    U/L


 


Myoglobin


 137.2 H


 


 10.0-92.0


NG/ML


 


Troponin I < 0.028   <0.028  NG/ML


 


Total Protein 7.6   6.4-8.2  GM/DL


 


Albumin 4.5   3.2-4.5  GM/DL


 


Serum Alcohol < 10   <10  MG/DL


 


Urine Opiates Screen  POSITIVE H NEGATIVE  


 


Urine Oxycodone Screen  POSITIVE H NEGATIVE  


 


Urine Methadone Screen  NEGATIVE  NEGATIVE  


 


Urine Propoxyphene Screen  NEGATIVE  NEGATIVE  


 


Urine Barbiturates Screen  NEGATIVE  NEGATIVE  


 


Ur Tricyclic Antidepressants


Screen 


 NEGATIVE 


 NEGATIVE  





 


Urine Phencyclidine Screen  NEGATIVE  NEGATIVE  


 


Urine Amphetamines Screen  POSITIVE H NEGATIVE  


 


Urine Methamphetamines Screen  POSITIVE H NEGATIVE  


 


Urine Benzodiazepines Screen  POSITIVE H NEGATIVE  


 


Urine Cocaine Screen  NEGATIVE  NEGATIVE  


 


Urine Cannabinoids Screen  NEGATIVE  NEGATIVE  








My Orders





Orders - VANIA WHITLEY MD


Cbc With Automated Diff (20 10:52)


Magnesium (20 10:52)


Chest 1 View, Ap/Pa Only (20 10:52)


Ekg Tracing (20 10:52)


Comprehensive Metabolic Panel (20 10:52)


Myoglobin Serum (20 10:52)


Protime With Inr (20 10:52)


Partial Thromboplastin Time (20 10:52)


O2 (20 10:52)


Monitor-Rhythm Ecg Trace Only (20 10:52)


Lipid Panel (20 06:00)


Ed Iv/Invasive Line Start (20 10:52)


Fibrin Degradation Products (20 10:52)


Troponin I (20 10:52)


Alcohol (20 10:52)


Drug Screen Stat (Urine) (20 10:52)


Ct Chest/Abdomen/Pelvis Wo (20 12:13)


Ns Iv 1000 Ml (Sodium Chloride 0.9%) (20 12:14)


Fentanyl  Injection (Sublimaze Injection (20 12:30)


Fentanyl  Injection (Sublimaze Injection (20 12:17)


Nitroglycerin 0.4 Mg Btl 25's (Nitrostat (20 14:00)


Aspirin Chewable Tablet (Baby Aspirin Ch (20 14:00)


Oxycodone/Apap 5/325mg Tablet (Percocet (20 14:30)


Enoxaparin Injection (Lovenox Injection) (20 14:45)





Medications Given in ED





Current Medications








 Medications  Dose


 Ordered  Sig/Latoya


 Route  Start Time


 Stop Time Status Last Admin


Dose Admin


 


 Aspirin  324 mg  ONCE  ONCE


 PO  20 14:00


 20 14:01 DC 20 14:00


324 MG


 


 Fentanyl Citrate  50 mcg  ONCE  ONCE


 IVP  20 12:30


 20 12:31 DC 20 12:24


50 MCG


 


 Nitroglycerin  0.4 mg  UD  PRN


 SL  20 14:00


    20 14:06


0.4 MG


 


 Oxycodone/


 Acetaminophen  1 tab  ONCE  ONCE


 PO  20 14:30


 20 14:31 DC 20 14:48


1 TAB








Vital Signs/I&O











 20





 10:45 10:45


 


Temp 36.6 


 


Pulse 105 


 


Resp 18 


 


B/P (MAP) 116/87 (97) 


 


Pulse Ox 99 


 


O2 Delivery  Room Air














Blood Pressure Mean:                    97











Progress


Progress Note :  


Progress Note


Patient was initially evaluated with the chest pain order set.  In addition a CT

of the chest, abdomen and pelvis was obtained to rule out traumatic injury.  No 

injuries were identified.  Patient was then treated with nitroglycerin and 

aspirin as part of a chest pain workup.  Patient states her pain improved from 

7/10 down to 2/10 after nitroglycerin 2.  She is being hydrated with a 1 L 

normal saline bolus.  Case was discussed with Dr. Sinclair who agrees with admission

for chest pain rule out.  Hopefully she can have a CT angiogram of the chest 

after creatinine improves with hydration.  Lovenox 70 mg subcutaneous is being 

given for presumptive treatment of PE/DVT.  Patient was methamphetamine positive

on her drug screen.  I discussed CODE STATUS with the patient and she very 

clearly states she wishes to have a DO NOT RESUSCITATE order.


Initial ECG Impression Date:  2020


Initial ECG Impression Time:  10:48


Initial ECG Rate:  96


Initial ECG Rhythm:  Normal Sinus


Initial ECG Impression:  Normal


Comment


Normal sinus rhythm with no ST elevation or depression.  No abnormal intervals 

or axis deviation.





Diagnostic Imaging





   Diagonstic Imaging:  Xray


   Plain Films/CT/US/NM/MRI:  chest


Comments


Chest x-ray viewed by me and report reviewed.  See report below:





NAME:   BOLIVAR NICOLASA J


MED REC#:   [id number]


ACCOUNT#:   [id number]


PT STATUS:   REG ER


:   1960


PHYSICIAN:   VANIA WHITLEY MD


ADMIT DATE:   20/ER


***Signed***


Date of Exam:20





CHEST 1 VIEW, AP/PA ONLY








INDICATION:  Chest pain.





TECHNIQUE:  Single view chest 11:30 AM.





CORRELATION STUDY:  2019





FINDINGS: 


The heart size, mediastinal configuration and pulmonary


vascularity are within normal limits.  


Lung fields are slightly hyperinflated but overall clear. No


infiltrate. Probable unchanged granuloma the right lung apex.





IMPRESSION: 


1. Stable chest demonstrates no acute abnormality.





Dictated by: 





  Dictated on workstation # [id number]








Dict:   20 1140


Trans:   20 1413


DO [id number]





Interpreted by:     ANETTE STUART DO


Electronically signed by: ANETTE STUART DO 20 1413








   Diagonstic Imaging:  CT


   Plain Films/CT/US/NM/MRI:  chest, abdomen, pelvis


Comments


Chest, abdomen and pelvis viewed by me and report reviewed.  See report below:





NAME:   NICOLAS,YVONNE J


MED REC#:   [id number]


ACCOUNT#:   [id number]


PT STATUS:   ADM Minerva


:   1960


PHYSICIAN:   VANIA WHITLEY MD


ADMIT DATE:   


***Signed***


Date of Exam:20





CT CHEST/ABDOMEN/PELVIS WO





PROCEDURE: CT chest, abdomen, and pelvis without contrast.





TECHNIQUE: Multiple contiguous axial images were obtained through


the chest, abdomen, and pelvis without the use of intravenous


contrast. Auto Exposure Controls were utilized during the CT exam


to meet ALARA standards for radiation dose reduction. 





INDICATION:  Chest pain. Symptoms since [place], one week. Post


fall as well.





CORRELATION STUDY: CT abdomen and pelvis, 2009.





FINDINGS:





CT CHEST:  


Assessment of the chest is limited given lack of contrast. The


heart size is within normal limits. Thoracic aortic contour


appears unremarkable with moderate calcification of the arch.


Definitive pathologically enlarged mediastinal lymph nodes are


not suggested on this noncontrast study.





Lung fields with minimal scarring or atelectasis posteromedially


at the right lung base. No consolidating infiltrate. Calcified


granuloma at the right upper lobe. Osseous structures demonstrate


no acute abnormality.





CT ABDOMEN and PELVIS:  


Limitations given lack of contrast. Additionally, there is


extensive metallic artifact from bilateral hip arthroplasty


obscuring large portion of the pelvis. The unenhanced liver,


spleen, pancreas, and adrenal glands are without acute


abnormality. Gallbladder appears absent with multiple clips in


the gallbladder fossa and braulio hepatic region. There is


mild-to-moderate aortoiliac wall calcification, nonaneurysmal.





The kidneys are without calcification. No obstructive uropathy.





Gastrointestinal tract demonstrates a few fluid-filled loops of


bowel to be present. No findings to suggest high-degree


obstruction. No definitive inflammation. Appendix not discretely


localized, but there are no focal right lower quadrant


inflammatory changes. Distal stomach and duodenum slightly


distended with fluid and gas.





The partially distended bladder grossly unremarkable, again is


markedly limited in assessment. There appears to be prior


hysterectomy changes.





IMPRESSION:





CT CHEST:


1.  Negative for acute abnormality of the chest.





CT ABDOMEN and PELVIS:


1. No definite evidence of acute abnormality about the abdomen or


pelvis. Assessment is fairly limited given lack of contrast.


Question of slight distention of the stomach and duodenum likely


of no significance. Post cholecystectomy changes.





Dictated by: 





  Dictated on workstation # [id number]





Dict:   20 1249


Trans:   20 1546


 [id number]





Interpreted by:     ANETTE STUART DO


Electronically signed by: ANETTE STUART DO 20 1546





Departure


Communication (Admissions)


Time/Spoke to Admitting Phy:  14:27


Dr. Sinclair


Time/Spoke to Consulting Phy:  14:25


Left a message with Dr. Huber





Impression





   Primary Impression:  


   Chest pain


   Qualified Codes:  R07.9 - Chest pain, unspecified


   Additional Impressions:  


   Fall with injury


   Qualified Codes:  W19.XXXA - Unspecified fall, initial encounter


   Elevated d-dimer


   Methamphetamine abuse


Disposition:   ADMITTED AS INPATIENT


Condition:  Improved





Admissions


Decision to Admit Reason:  Admit from ER (General)


Decision to Admit/Date:  2020


Time/Decision to Admit Time:  14:27





Departure-Patient Inst.


Referrals:  


NO,LOCAL PHYSICIAN (PCP/Family)


Primary Care Physician











VANIA WHITLEY MD         2020 14:28

## 2020-01-01 NOTE — DIAGNOSTIC IMAGING REPORT
INDICATION:  Chest pain.



TECHNIQUE:  Single view chest 11:30 AM.



CORRELATION STUDY:  07/30/2019



FINDINGS: 

The heart size, mediastinal configuration and pulmonary

vascularity are within normal limits.  

Lung fields are slightly hyperinflated but overall clear. No

infiltrate. Probable unchanged granuloma the right lung apex.



IMPRESSION: 

1. Stable chest demonstrates no acute abnormality.



Dictated by: 



  Dictated on workstation # [id number]

## 2020-01-01 NOTE — DIAGNOSTIC IMAGING REPORT
PROCEDURE: CT chest, abdomen, and pelvis without contrast.



TECHNIQUE: Multiple contiguous axial images were obtained through

the chest, abdomen, and pelvis without the use of intravenous

contrast. Auto Exposure Controls were utilized during the CT exam

to meet ALARA standards for radiation dose reduction. 



INDICATION:  Chest pain. Symptoms since [place], one week. Post

fall as well.



CORRELATION STUDY: CT abdomen and pelvis, 04/28/2009.



FINDINGS:



CT CHEST:  

Assessment of the chest is limited given lack of contrast. The

heart size is within normal limits. Thoracic aortic contour

appears unremarkable with moderate calcification of the arch.

Definitive pathologically enlarged mediastinal lymph nodes are

not suggested on this noncontrast study.



Lung fields with minimal scarring or atelectasis posteromedially

at the right lung base. No consolidating infiltrate. Calcified

granuloma at the right upper lobe. Osseous structures demonstrate

no acute abnormality.



CT ABDOMEN and PELVIS:  

Limitations given lack of contrast. Additionally, there is

extensive metallic artifact from bilateral hip arthroplasty

obscuring large portion of the pelvis. The unenhanced liver,

spleen, pancreas, and adrenal glands are without acute

abnormality. Gallbladder appears absent with multiple clips in

the gallbladder fossa and braulio hepatic region. There is

mild-to-moderate aortoiliac wall calcification, nonaneurysmal.



The kidneys are without calcification. No obstructive uropathy.



Gastrointestinal tract demonstrates a few fluid-filled loops of

bowel to be present. No findings to suggest high-degree

obstruction. No definitive inflammation. Appendix not discretely

localized, but there are no focal right lower quadrant

inflammatory changes. Distal stomach and duodenum slightly

distended with fluid and gas.



The partially distended bladder grossly unremarkable, again is

markedly limited in assessment. There appears to be prior

hysterectomy changes.



IMPRESSION:



CT CHEST:

1.  Negative for acute abnormality of the chest.



CT ABDOMEN and PELVIS:

1. No definite evidence of acute abnormality about the abdomen or

pelvis. Assessment is fairly limited given lack of contrast.

Question of slight distention of the stomach and duodenum likely

of no significance. Post cholecystectomy changes.



Dictated by: 



  Dictated on workstation # [id number]

## 2020-01-01 NOTE — CONSULTATION-CARDIOLOGY
HPI-Cardiology


Cardiology Consultation:


Date of Consultation


1/1/20


Date of Admission





Attending Physician


Kizzy Sinclair MD


Admitting Physician


No,Local Physician


Consulting Physician


MERVAT IGNACIO MD





HPI:


Time Seen by a Provider:  16:22


Chief Complaint:


Chest pain


This is a 59-year-old lady who has history of drug abuse as well as active 

smoking.  She presents with a fall on [place] day.  She is having chest pain. 

Central chest.  No radiation.  Worse with movement.  No other associated cardiac

symptoms including shortness of breath, syncope, near-syncope or palpitations.  

Negative pertinent family history.





Review of Systems-Cardiology


Review of Systems


Constitutional:  As described under HPI; No As described under HPI, No no 

symptoms reported, No chills, No fever, No lightheadedness


Eyes:  No As described under HPI, No no symptoms reported, No blindness, No 

blurred vision, No contact lenses, No drainage, No decreased acuity, No foreign 

body sensation, No pain, No vision change


Ears/Nose/Throat:  No As described under HPI, No no symptoms reported, No 

chronic hearing loss, No ear discharge, No ear pain, No nasal drainage, No 

ulcerations


Respiratory:  No no symptoms reported; As described under HPI; No As described 

under HPI, No cough, No orthopnea, No shortness of breath, No SOB with excertion


Cardiovascular:  No no symptoms reported; As described under HPI; No As 

described under HPI; chest pain; No edema, No irregular heart rate, No 

lightheadedness, No palpitations


Gastrointestinal:  No no symptoms reported, No As described under HPI, No 

abdomen distended, No abdominal pain, No blood streaked bowels, No constipation,

No diarrhea, No nausea, No vomiting, No stool coloration changes


Genitourinary:  No As described under HPI, No burning, No dysuria, No discharge,

No frequency, No flank pain, No hematuria, No urgency


Pregnant:  Yes


Pregnant:  No


Skin:  No rash, No skin related problems, No ulcerations


Psychiatric/Neurological:  No anxiety, No depression, No seizure, No focal 

weakness, No syncope


Hematologic:  No bleeding abnormalities





PMH-Social-Family Hx


Patient Social History


Alcohol Use:  Denies Use


Recreational Drug Use:  No


Smoking Status:  Current Everyday Smoker


Type Used:  Cigarettes


2nd Hand Smoke Exposure:  Yes


Recent Foreign Travel:  No


Recent Infectious Disease Expo:  No


Hospitalization with Isolation:  Denies





Immunizations Up To Date


Tetanus Booster (TDap):  Unknown


Date of Pneumonia Vaccine:  Jan 1, 2009





Past Medical History


PMH


As described under Assessment.





Family Medical History


Family History:  


Cancer


  03 MOTHER (SPINAL AND KIDNEY)


Cataract


  03 MOTHER


Congestive heart failure


  03 FATHER


Family history: Gastrointestinal disease


  09 BROTHER


Family history: Hypertension


  03 FATHER


  03 MOTHER


  09 BROTHER


Myocardial infarction


  03 FATHER


  09 BROTHER


Parkinson's disease


  03 MOTHER


Stroke


  03 MOTHER


  09 BROTHER (TIA)


  09 SISTER (TIA)





Allergies and Home Medications


Allergies


Coded Allergies:  


     propoxyphene (Unverified  Allergy, Mild, 4/29/10)


     adhesive (Unverified  Allergy, Unknown, RASH, 9/7/17)


     Iodinated Contrast Media (Verified  Adverse Reaction, Intermediate, NAUSEA,

10/26/13)


   


   SEVERE VOMITTING





Home Medications


Aspirin 325 Mg Tabec, 325 MG PO DAILY, (Reported)


Ipratropium Bromide 12.9 Gm Aers, 2 PUFF IH Q6H


   Prescribed by: SWAPNA MAIER on 7/30/19 1334


Ondansetron 4 Mg Tab.rapdis, 4 MG PO Q6H PRN for NAUSEA/VOMITING-1ST LINE


   Prescribed by: DALILA JENKINS on 9/7/17 1622


Oxycodone Hcl/Acetaminophen 1 Each Tablet, 1 EACH PO Q4-6H PRN, (Reported)





Patient Home Medication List


Home Medication List Reviewed:  Yes





Physical Exam-Cardiology


Physical Exam


Vital Signs/I&O











 1/1/20 1/1/20 1/1/20 1/1/20





 10:45 10:45 15:16 15:30


 


Temp 36.6   37.2


 


Pulse 105  93 67


 


Resp 18  18 18


 


B/P (MAP) 116/87 (97)  121/73 (97) 147/71


 


Pulse Ox 99  99 99


 


O2 Delivery  Room Air Room Air Room Air


 


    





 1/1/20 1/1/20  





 15:30 16:11  


 


Temp 37.2   


 


Pulse 67 81  


 


Resp 18   


 


B/P (MAP) 147/71 (96)   


 


Pulse Ox 99   


 


O2 Delivery Room Air   





Capillary Refill : Less Than 3 Seconds


Constitutional:  appears stated age; No apparent distress; well-developed, well-

nourished


HEENT:  PERRL; No discharge; hearing is well preserved, oral hygience is good; 

No ulceration, No xanthelasmas are seen


Neck:  No carotid bruit; carotid pulses are 2 + bilaterally


Respiratory:  chest is bilaterally symmetric, lungs clear to auscultation


Cardiovascular:  regular rate-rhythm, S1 and S2


Gastrointestinal:  soft, audible bowel sounds; No spleenomegaly


Rectal:  deferred


Extremities:  normal range of motion, non-tender, normal inspection; No 

clubbing, No cyanosis; no lower extremity edema bilateral; No significant edema


Neurologic/Psychiatric:  no motor/sensory deficits, alert, normal mood/affect, 

oriented x 3, power is 5/5 both on sides


Skin:  normal color, warm/dry; No rash, No ulcerations





Data Review


Labs


Laboratory Tests


1/1/20 11:25: 


White Blood Count 8.8, Red Blood Count 3.95L, Hemoglobin 12.8, Hematocrit 36, 

Mean Corpuscular Volume 92, Mean Corpuscular Hemoglobin 32, Mean Corpuscular 

Hemoglobin Concent 35, Red Cell Distribution Width 12.8, Platelet Count 228, 

Mean Platelet Volume 9.7, Neutrophils (%) (Auto) 55, Lymphocytes (%) (Auto) 33, 

Monocytes (%) (Auto) 9, Eosinophils (%) (Auto) 4, Basophils (%) (Auto) 1, 

Neutrophils # (Auto) 4.8, Lymphocytes # (Auto) 2.9, Monocytes # (Auto) 0.8, 

Eosinophils # (Auto) 0.3, Basophils # (Auto) 0.0, Prothrombin Time 13.8, INR 

Comment 1.0, Activated Partial Thromboplast Time 24, D-Dimer 0.77H, Sodium Level

139, Potassium Level 4.3, Chloride Level 108H, Carbon Dioxide Level 17L, Anion 

Gap 14, Blood Urea Nitrogen 23H, Creatinine 1.37H, Estimat Glomerular Filtration

Rate 40, BUN/Creatinine Ratio 17, Glucose Level 89, Calcium Level 9.4, Corrected

Calcium 9.0, Magnesium Level 1.9, Total Bilirubin 0.6, Aspartate Amino Transf 

(AST/SGOT) 28, Alanine Aminotransferase (ALT/SGPT) 16, Alkaline Phosphatase 140H

, Myoglobin 137.2H, Troponin I < 0.028, Total Protein 7.6, Albumin 4.5, Serum 

Alcohol < 10


1/1/20 12:00: 


Urine Opiates Screen POSITIVEH, Urine Oxycodone Screen POSITIVEH, Urine 

Methadone Screen NEGATIVE, Urine Propoxyphene Screen NEGATIVE, Urine 

Barbiturates Screen NEGATIVE, Ur Tricyclic Antidepressants Screen NEGATIVE, 

Urine Phencyclidine Screen NEGATIVE, Urine Amphetamines Screen POSITIVEH, Urine 

Methamphetamines Screen POSITIVEH, Urine Benzodiazepines Screen POSITIVEH, Urine

Cocaine Screen NEGATIVE, Urine Cannabinoids Screen NEGATIVE








ECG Impression


ECG


Initial ECG Rhythm:  Normal Sinus


Initial ECG Impression:  Nonspecific Changes





A/P-Cardiology


Assessment/Admission Diagnosis


Chest trauma,


Chest pain,


Active drug abuse,


Active smoking,


Hypertension, 


COPD





Plan


Chest trauma, CT scan negative.


Chest pain, serial troponin.  First EKG negative.  If serial troponin negative 

will request nuclear stress testing.


Active drug abuse, defer to the primary team.


Active smoking, smoking cessation strongly recommended.


Hypertension, continue outpatient medical therapy.


COPD, continue outpatient inhalers.








Thank you for your consultation. Please call me if you have any questions.








KENNETH Ignacio MD, FACP, FACC, FSCAI, FHRS, CCDS


Interventional Cardiology


Cardiac Electrophysiology


Vascular Medicine and Endovascular Interventions





Clinical Quality Measures


AMI/AHF:


ASA po Prior to arrival:  MERVAT Acosta MD              Jan 1, 2020 16:08

## 2020-01-02 VITALS — SYSTOLIC BLOOD PRESSURE: 95 MMHG | DIASTOLIC BLOOD PRESSURE: 63 MMHG

## 2020-01-02 VITALS — SYSTOLIC BLOOD PRESSURE: 128 MMHG | DIASTOLIC BLOOD PRESSURE: 67 MMHG

## 2020-01-02 RX ADMIN — NITROGLYCERIN PRN MG: 0.4 TABLET SUBLINGUAL at 03:35

## 2020-01-02 RX ADMIN — MORPHINE SULFATE PRN MG: 4 INJECTION, SOLUTION INTRAMUSCULAR; INTRAVENOUS at 02:02

## 2020-01-02 RX ADMIN — SODIUM CHLORIDE SCH MLS/HR: 900 INJECTION, SOLUTION INTRAVENOUS at 04:36

## 2020-01-02 RX ADMIN — NITROGLYCERIN PRN MG: 0.4 TABLET SUBLINGUAL at 03:30

## 2020-01-02 RX ADMIN — NITROGLYCERIN PRN MG: 0.4 TABLET SUBLINGUAL at 03:40

## 2020-01-02 RX ADMIN — MORPHINE SULFATE PRN MG: 4 INJECTION, SOLUTION INTRAMUSCULAR; INTRAVENOUS at 03:55

## 2020-01-02 NOTE — SHORT STAY SUMMARY-HOSPITALIST
History of Present Illness


HPI/Chief Complaint


THIS IS FOR DOCUMENTATION PURPOSES ONLY. I DID NOT SEE PATIENT PRIOR TO HER 

LEAVING AMA.


Date Seen


20


Time Seen by a Provider:  13:39


Attending Physician


Jeovany Sinclair MD


PCP


No,Local Physician


Referring Physician





Date of Admission


2020 at 14:33





Home Medications & Allergies


Home Medications


Reviewed patient Home Medication Reconciliation performed by pharmacy medication

reconciliations technician and/or nursing.


Patients Allergies have been reviewed.





Allergies





Allergies


Coded Allergies


  propoxyphene (Unverified Allergy, Mild, 4/29/10)


  adhesive (Unverified Allergy, Unknown, RASH, 17)


  Iodinated Contrast Media (Verified Adverse Reaction, Intermediate, NAUSEA, 

10/26/13)


    SEVERE VOMITTING








Past Medical-Social-Family Hx


Past Med/Social Hx:  Reviewed Nursing Past Med/Soc Hx


Patient Social History


Alcohol Use:  Denies Use


Recreational Drug Use:  No


Smoking Status:  Current Everyday Smoker


Type Used:  Cigarettes


2nd Hand Smoke Exposure:  Yes


Recent Foreign Travel:  No


Contact w/other who traveled:  No


Recent Hopitalizations:  No (PNEUMONIA, KIDNEY INFECTIONS, SURGERIES)


Recent Infectious Disease Expo:  No





Immunizations Up To Date


Tetanus Booster (TDap):  Unknown


Date of Pneumonia Vaccine:  2009





Seasonal Allergies


Seasonal Allergies:  Yes ("SINUS PROBLEMS")





Past Medical History


Surgeries:  Appendectomy, Gallbladder, Hysterectomy, Orthopedic


Pregnant:  No


Reproductive:  No


Sexually Transmitted Disease:  No


HIV/AIDS:  No


Hysterectomy


Genitourinary:  Kidney Infection, Kidney Stones


Gastrointestinal:  Gastroesophageal Reflux, Ulcer


Musculoskeletal:  Arthritis


HEENT:  Cataract


Loss of Vision:  Denies


Hearing Impairment:  Denies


Psychosocial:  Sleep Difficulties, Anxiety, Depression


History of Blood Disorders:  No


Adverse Reaction to Blood Brown:  No





Family History


Reviewed Nursing Family Hx





Cancer


  03 MOTHER (SPINAL AND KIDNEY)


Cataract


  03 MOTHER


Congestive heart failure


  03 FATHER


Family history: Gastrointestinal disease


  09 BROTHER


Family history: Hypertension


  03 FATHER


  03 MOTHER


  09 BROTHER


Myocardial infarction


  03 FATHER


  09 BROTHER


Parkinson's disease


  03 MOTHER


Stroke


  03 MOTHER


  09 BROTHER (TIA)


  09 SISTER (TIA)


No Pertinent Family Hx





Review of Systems


Constitutional:  see HPI





Physical Exam


Physical Exam


Vital Signs





Vital Signs - First Documented








 20





 10:45


 


Temp 36.6


 


Pulse 105


 


Resp 18


 


B/P (MAP) 116/87 (97)


 


Pulse Ox 99


 


O2 Delivery Room Air





Capillary Refill : Less Than 3 Seconds


Height, Weight, BMI


Height: 5'5.00"


Weight: 154lbs. oz. 69.354189gi; 21.12 BMI


Method:Stated


General Appearance:  Other (Did not see)


Respiratory:  Other (anterior chest tender to palpation)


Gastrointestinal:  Tenderness (mild, generalized)





Results


Results/Procedures


Labs


Laboratory Tests


20 11:25








Patient resulted labs reviewed.





Short Stay Diagnosis


Discharge Diagnosis-Short Stay


Admission Diagnosis


Chest Pain


Final Discharge Diagnosis


Chest Pain





Conclusion


Plan


Chest Pain


   Pt was admitted due to chest pain but prior to completing work up for this 

(stress test) she elected to sign out AMA


   I did not see patient, this is for documentation purposes only





Diagnosis/Problems


Diagnosis/Problems





(1) Chest pain


Status:  Acute


Qualifiers:  


   Qualified Codes:  R07.9 - Chest pain, unspecified





Clinical Quality Measures


AMI/AHF:


ASA po Prior to arrival:  No





DVT/VTE Risk/Contraindication:


Risk Factor Score Per Nursin


RFS Level Per Nursing on Admit:  2=Moderate











JEOVANY SINCLAIR MD               2020 09:14

## 2020-01-02 NOTE — NUR
0326- THIS RN NOTIFIED BY PATIENT OF CONSTANT, SHARP CHEST PAIN TO MEDIAL STERNUM WITHOUT 
RADIATION. PT INSTRUCTED TO TAKE DEEP BREATHS THROUGH NOSE AND OUT OF HER MOUTH. CHEST PAIN 
PERSISTS WITHOUT RELIEF.

0330- NITRO 0.4 MG GIVEN SUBLINGUAL UNDER TONGUE PER ORDER AT THIS TIME.

0336- EKG OBTAINED PER CHEST PAIN PRN ORDER. EKG SHOWS SINUS TACHYCARDIA.

0335- CHEST PAIN NOT RELIEVED BY FIRST NITRO. SECOND NITRO 0.4 MG GIVEN SUBLINGUALLY PER 
ORDER.

0337- VS AS FOLLOWING BP- 95/63 PULSE 76 RESPIRATION 22 O2 SAT OF 97% ON RA.

0340- CHEST PAIN STILL PRESENT. THIRD NITRO 0.4 MG GIVEN PER ORDER.

0350- NITRO HAD NO EFFECT. CHEST PAIN STILL PRESENT. MORPHINE 4 MG GIVEN PER ORDER IF CHEST 
PAIN NOT CONTROLLED BY NITRO.

0356- DR. IGNACIO NOTIFIED OF PATIENT'S CONSISTENT CHEST PAIN AFTER MEDICATION, EKG, AND 
VITAL SIGNS. SANDEE NOTIFIED THIS RN THAT THE ISSUE ISN'T CARDIAC RELATED AND I WOULD HAVE 
TO CONTACT THE PRIMARY CARE TEAM FOR ADDITIONAL ORDERS.

0359- DR. ARRINGTON NOTIFIED OF PATIENT CONDITION. MEDICATIONS GIVEN. EKG RESULTS AND DR. IGNACIO'S STATEMENT. NEW ORDERS FOR TORADOL 15 MG IV Q6 FOR PAIN RECEIVED AT THIS TIME.

0406- TORADOL 15 MG IV GIVEN PER ORDER AT THIS TIME.

0415- PATIENT STATES HER CHEST PAIN HAS IMPROVED AND ISN'T AS BAD AS IT WAS BEFORE. PATIENT 
INSTRUCTED TO TAKE DEEP BREATHS THROUGH NOSE AND OUT OF HER MOUTH. FAMILY PRESENT AT THIS 
TIME AND FURTHER EDUCATED ON THE TESTS/PROCEDURES THAT WOULD BE TAKING PLACE TODAY. PATIENT 
AT EASE AT THIS TIME. WILL CONTINUE TO MONITOR FOR ADDITIONAL CHEST PAIN AND SYMPTOMS.

## 2020-01-02 NOTE — NUR
0530- IV STARTED LEAKING. TRIED TO MAINTAIN IV ACCESS, BUT UPON ASSESSMENT IV WAS NOT 
SALVAGEABLE. PATIENT ANXIOUS ABOUT THE START OF ANOTHER IV, BUT WAS WILLING TO ALLOW US TO 
TRY. UNSUCCESSFUL ATTEMPT X1 BY PERFECTO BEVERLY. UNSUCCESSFUL ATTEMPT X1 BY PERFECTO PRIETO.

0620- THIS RN LET THE PATIENT CALM DOWN FROM THE UNSUCCESSFUL IV ATTEMPTS AT PATIENT 
REQUEST. NOTIFIED DR. ARRINGTON THAT IV ACCESS WAS LIMITED IN THE UPPER ARMS AND ASKED IF WE 
COULD TRY TO STICK ONE IN HER FOOT OR LOWER EXTREMITY. DR. ARRINGTON AGREED AND ORDERED IV STICK 
IN FOOT OR TO CONTACT ANESTHESIA FOR ULTRASOUND GUIDED IV INSERTION.

0625- THIS RN EXPLAINED TO THE PATIENT THAT IV ACCESS IN THE ARMS WAS LIMITED AND WE WOULD 
LIKE TO TRY TO FIND AN IV IN THE FOOT OR LOWER EXTREMITY. PATIENT AGREES AND ALLOWS US TO 
TRY IV. THIS RN HAD X1 UNSUCCESSFUL IV ATTEMPT IN FOOT. PT BECOMES AGITATED AND SCREAMS "GET 
THAT FUCKING THING OUT OF ME RIGHT NOW. I AM SO FUCKING DOWN WITH THIS PLACE. I AM DONE." I 
IMMEDIATELY REMOVED THE IV AND PATIENT STATES "I WANT TO LEAVE." THIS RN ASKS, "ARE YOU 
TELLING ME YOU WOULD LIKE TO LEAVE AMA?" PT STATES YES. 

0634- DR. ARRINGTON NOTIFIED OF PATIENT'S REQUEST TO LEAVE AMA. DR. ARRINGTON AGREES AND SAYS LET HER 
LEAVE. 

0638- AMA PAPERWORK SIGNED BY PATIENT. RISKS OF LEAVING AMA DISCUSSED WITH PATIENT THAT 
LEAVING AMA COULD RESULT IN DEATH AND THAT HER CHEST PAIN WOULD NOT BE FIGURED OUT. PT 
AGREES AND SIGNS PAPERWORK AT THIS TIME. 

0710- PATIENT LEAVES WITH SON CARRYING HER OWN PERSONAL BELONGINGS.

## 2020-01-13 ENCOUNTER — HOSPITAL ENCOUNTER (EMERGENCY)
Dept: HOSPITAL 75 - ER | Age: 60
Discharge: HOME | End: 2020-01-13
Payer: MEDICARE

## 2020-01-13 VITALS — HEIGHT: 67.99 IN | WEIGHT: 134.48 LBS | BODY MASS INDEX: 20.38 KG/M2

## 2020-01-13 VITALS — DIASTOLIC BLOOD PRESSURE: 81 MMHG | SYSTOLIC BLOOD PRESSURE: 152 MMHG

## 2020-01-13 DIAGNOSIS — K21.9: ICD-10-CM

## 2020-01-13 DIAGNOSIS — F32.9: ICD-10-CM

## 2020-01-13 DIAGNOSIS — Z79.82: ICD-10-CM

## 2020-01-13 DIAGNOSIS — Z90.710: ICD-10-CM

## 2020-01-13 DIAGNOSIS — Z88.8: ICD-10-CM
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
## 2020-01-13 DIAGNOSIS — J44.9: ICD-10-CM

## 2020-01-13 DIAGNOSIS — R07.89: Primary | ICD-10-CM

## 2020-01-13 DIAGNOSIS — Z91.041: ICD-10-CM

## 2020-01-13 DIAGNOSIS — Z87.442: ICD-10-CM

## 2020-01-13 DIAGNOSIS — F17.210: ICD-10-CM

## 2020-01-13 DIAGNOSIS — Z80.51: ICD-10-CM

## 2020-01-13 DIAGNOSIS — Z82.49: ICD-10-CM

## 2020-01-13 DIAGNOSIS — Z90.49: ICD-10-CM

## 2020-01-13 DIAGNOSIS — F41.9: ICD-10-CM

## 2020-01-13 DIAGNOSIS — Z80.8: ICD-10-CM

## 2020-01-13 LAB
ALBUMIN SERPL-MCNC: 4 GM/DL (ref 3.2–4.5)
ALP SERPL-CCNC: 126 U/L (ref 40–136)
ALT SERPL-CCNC: 12 U/L (ref 0–55)
APTT BLD: 32 SEC (ref 24–35)
BASOPHILS # BLD AUTO: 0.1 10^3/UL (ref 0–0.1)
BASOPHILS NFR BLD AUTO: 1 % (ref 0–10)
BILIRUB SERPL-MCNC: 0.4 MG/DL (ref 0.1–1)
BUN/CREAT SERPL: 8
CALCIUM SERPL-MCNC: 8.7 MG/DL (ref 8.5–10.1)
CHLORIDE SERPL-SCNC: 106 MMOL/L (ref 98–107)
CO2 SERPL-SCNC: 20 MMOL/L (ref 21–32)
CREAT SERPL-MCNC: 0.72 MG/DL (ref 0.6–1.3)
EOSINOPHIL # BLD AUTO: 0.4 10^3/UL (ref 0–0.3)
EOSINOPHIL NFR BLD AUTO: 6 % (ref 0–10)
ERYTHROCYTE [DISTWIDTH] IN BLOOD BY AUTOMATED COUNT: 13 % (ref 10–14.5)
GFR SERPLBLD BASED ON 1.73 SQ M-ARVRAT: > 60 ML/MIN
GLUCOSE SERPL-MCNC: 96 MG/DL (ref 70–105)
HCT VFR BLD CALC: 38 % (ref 35–52)
HGB BLD-MCNC: 13.4 G/DL (ref 11.5–16)
INR PPP: 1 (ref 0.8–1.4)
LYMPHOCYTES # BLD AUTO: 2.3 X 10^3 (ref 1–4)
LYMPHOCYTES NFR BLD AUTO: 35 % (ref 12–44)
MAGNESIUM SERPL-MCNC: 1.7 MG/DL (ref 1.6–2.4)
MANUAL DIFFERENTIAL PERFORMED BLD QL: NO
MCH RBC QN AUTO: 32 PG (ref 25–34)
MCHC RBC AUTO-ENTMCNC: 35 G/DL (ref 32–36)
MCV RBC AUTO: 92 FL (ref 80–99)
MONOCYTES # BLD AUTO: 0.4 X 10^3 (ref 0–1)
MONOCYTES NFR BLD AUTO: 6 % (ref 0–12)
NEUTROPHILS # BLD AUTO: 3.4 X 10^3 (ref 1.8–7.8)
NEUTROPHILS NFR BLD AUTO: 52 % (ref 42–75)
PLATELET # BLD: 250 10^3/UL (ref 130–400)
PMV BLD AUTO: 10 FL (ref 7.4–10.4)
POTASSIUM SERPL-SCNC: 3.9 MMOL/L (ref 3.6–5)
PROT SERPL-MCNC: 7 GM/DL (ref 6.4–8.2)
PROTHROMBIN TIME: 13.3 SEC (ref 12.2–14.7)
SODIUM SERPL-SCNC: 138 MMOL/L (ref 135–145)
WBC # BLD AUTO: 6.4 10^3/UL (ref 4.3–11)

## 2020-01-13 PROCEDURE — 83880 ASSAY OF NATRIURETIC PEPTIDE: CPT

## 2020-01-13 PROCEDURE — 93005 ELECTROCARDIOGRAM TRACING: CPT

## 2020-01-13 PROCEDURE — 84484 ASSAY OF TROPONIN QUANT: CPT

## 2020-01-13 PROCEDURE — 85610 PROTHROMBIN TIME: CPT

## 2020-01-13 PROCEDURE — 71275 CT ANGIOGRAPHY CHEST: CPT

## 2020-01-13 PROCEDURE — 83874 ASSAY OF MYOGLOBIN: CPT

## 2020-01-13 PROCEDURE — 85379 FIBRIN DEGRADATION QUANT: CPT

## 2020-01-13 PROCEDURE — 83735 ASSAY OF MAGNESIUM: CPT

## 2020-01-13 PROCEDURE — 85025 COMPLETE CBC W/AUTO DIFF WBC: CPT

## 2020-01-13 PROCEDURE — 36415 COLL VENOUS BLD VENIPUNCTURE: CPT

## 2020-01-13 PROCEDURE — 96374 THER/PROPH/DIAG INJ IV PUSH: CPT

## 2020-01-13 PROCEDURE — 85730 THROMBOPLASTIN TIME PARTIAL: CPT

## 2020-01-13 PROCEDURE — 80053 COMPREHEN METABOLIC PANEL: CPT

## 2020-01-13 PROCEDURE — 93041 RHYTHM ECG TRACING: CPT

## 2020-01-13 PROCEDURE — 71045 X-RAY EXAM CHEST 1 VIEW: CPT

## 2020-01-13 NOTE — ED CARDIAC GENERAL
History of Present Illness


General


Chief Complaint:  Chest Pain


Stated Complaint:  CP W/ MOVEMENT


Nursing Triage Note:  


Pt ambulatory to triage with c/o ongoing chest pain that has persisted for at 


least a week.  Pt reports being seen in the ED last week and was admitted for 


suspected blood clot in the lungs and kidney failure.  Pt reports leaving AMA 


and symptoms have worsened since last week.  Pt reports pain is worse with 


movement and radiates to the back.


Source:  patient


Exam Limitations:  no limitations





History of Present Illness


Date Seen by Provider:  Jan 13, 2020


Time Seen by Provider:  17:12


Initial Comments


To ER with reports of ongoing chest pain for about a week ever since she fell 

landing on the front of her chest. She was seen here last week, left AGAINST 

MEDICAL ADVICE shortly into the admission before any additional workup other 

than the emergency room workup could be completed. This central chest pain just 

to the left of the sternum is nontender to palpation but is significantly 

worsened by movement.


Timing/Duration:  5-6 days


Severity:  moderate


Location:  central


Activities at Onset:  none


NTG SL PTA:  No


ASA po PTA:  No





Allergies and Home Medications


Allergies


Coded Allergies:  


     propoxyphene (Unverified  Allergy, Mild, 4/29/10)


     adhesive (Unverified  Allergy, Unknown, RASH, 9/7/17)


     Iodinated Contrast Media (Verified  Adverse Reaction, Intermediate, NAUSEA,

10/26/13)


   


   SEVERE VOMITTING





Home Medications


Aspirin 325 Mg Tabec, 325 MG PO DAILY, (Reported)


Ipratropium Bromide 12.9 Gm Aers, 2 PUFF IH Q6H


   Prescribed by: SWAPNA MAIER on 7/30/19 1334





Patient Home Medication List


Home Medication List Reviewed:  Yes





Review of Systems


Review of Systems


Constitutional:  see HPI


EENTM:  No Symptoms Reported


Respiratory:  No Symptoms Reported


Cardiovascular:  See HPI, Chest Pain


Gastrointestinal:  See HPI


Genitourinary:  No Symptoms Reported


Musculoskeletal:  no symptoms reported


Skin:  no symptoms reported


Psychiatric/Neurological:  No Symptoms Reported


Endocrine:  No Symptoms Reported


Hematologic/Lymphatic:  No Symptoms Reported





Past Medical-Social-Family Hx


Patient Social History


Alcohol Use:  Past History


Recreational Drug Use:  No


Smoking Status:  Current Everyday Smoker


Type Used:  Cigarettes


2nd Hand Smoke Exposure:  Yes


Recent Foreign Travel:  No


Contact w/Someone Who Travel:  No


Recent Infectious Disease Expo:  No


Recent Hopitalizations:  Yes (1/20 left ama)





Immunizations Up To Date


Tetanus Booster (TDap):  Unknown


Date of Pneumonia Vaccine:  Jan 1, 2009





Seasonal Allergies


Seasonal Allergies:  Yes ("SINUS PROBLEMS")





Past Medical History


Surgeries:  Yes (HYST,APPY,SAMARIA, COCCYX BONE, R HIP)


Appendectomy, Gallbladder, Hysterectomy, Orthopedic


Respiratory:  Yes


COPD


Cardiac:  Yes (WPW, tachycardia)


Neurological:  No


Reproductive Disorders:  No


GYN History:  Hysterectomy


Sexually Transmitted Disease:  No


HIV/AIDS:  No


Kidney Infection, Kidney Stones


Gastrointestinal:  Yes


Gastroesophageal Reflux, Ulcer


Musculoskeletal:  Yes


Arthritis


Endocrine:  No


Cataract


Loss of Vision:  Denies


Hearing Impairment:  Denies


Cancer:  No


Psychosocial:  No


Sleep Difficulties, Anxiety, Depression


Integumentary:  No (BLISTERS FROM RECENT TAPE)


Blood Disorders:  No


Adverse Reaction/Blood Tranf:  No





Family Medical History





Cancer


  03 MOTHER (SPINAL AND KIDNEY)


Cataract


  03 MOTHER


Congestive heart failure


  03 FATHER


Family history: Gastrointestinal disease


  09 BROTHER


Family history: Hypertension


  03 FATHER


  03 MOTHER


  09 BROTHER


Myocardial infarction


  03 FATHER


  09 BROTHER


Parkinson's disease


  03 MOTHER


Stroke


  03 MOTHER


  09 BROTHER (TIA)


  09 SISTER (TIA)


No Pertinent Family Hx





Physical Exam


Vital Signs





Vital Signs - First Documented








 1/13/20





 15:43


 


Temp 36.8


 


Pulse 80


 


Resp 15


 


B/P (MAP) 151/88 (109)


 


Pulse Ox 98


 


O2 Delivery Room Air





Capillary Refill : Less Than 3 Seconds


Height, Weight, BMI


Height: 5'5.00"


Weight: 154lbs. oz. 69.573716ua; 20.00 BMI


Method:Stated


General Appearance:  No Apparent Distress, WD/WN, Chronically ill, Thin, Other 

(appears much older than stated age)


HEENT:  PERRL/EOMI, TMs Normal


Neck:  Full Range of Motion, Normal Inspection


Respiratory:  Chest Non Tender, Lungs Clear, Normal Breath Sounds, No Accessory 

Muscle Use, No Respiratory Distress


Cardiovascular:  Regular Rate, Rhythm, Normal Peripheral Pulses


Gastrointestinal:  Non Tender, Soft


Extremity:  Normal Capillary Refill, Normal Inspection


Neurologic/Psychiatric:  Alert, Oriented x3


Skin:  Normal Color, Warm/Dry





Progress/Results/Core Measures


Results/Orders


Lab Results





Laboratory Tests








Test


 1/13/20


17:02 1/13/20


17:21 Range/Units


 


 


White Blood Count


 6.4 


 


 4.3-11.0


10^3/uL


 


Red Blood Count


 4.18 L


 


 4.35-5.85


10^6/uL


 


Hemoglobin 13.4   11.5-16.0  G/DL


 


Hematocrit 38   35-52  %


 


Mean Corpuscular Volume 92   80-99  FL


 


Mean Corpuscular Hemoglobin 32   25-34  PG


 


Mean Corpuscular Hemoglobin


Concent 35 


 


 32-36  G/DL





 


Red Cell Distribution Width 13.0   10.0-14.5  %


 


Platelet Count


 250 


 


 130-400


10^3/uL


 


Mean Platelet Volume 10.0   7.4-10.4  FL


 


Neutrophils (%) (Auto) 52   42-75  %


 


Lymphocytes (%) (Auto) 35   12-44  %


 


Monocytes (%) (Auto) 6   0-12  %


 


Eosinophils (%) (Auto) 6   0-10  %


 


Basophils (%) (Auto) 1   0-10  %


 


Neutrophils # (Auto) 3.4   1.8-7.8  X 10^3


 


Lymphocytes # (Auto) 2.3   1.0-4.0  X 10^3


 


Monocytes # (Auto) 0.4   0.0-1.0  X 10^3


 


Eosinophils # (Auto)


 0.4 H


 


 0.0-0.3


10^3/uL


 


Basophils # (Auto)


 0.1 


 


 0.0-0.1


10^3/uL


 


Sodium Level 138   135-145  MMOL/L


 


Potassium Level 3.9   3.6-5.0  MMOL/L


 


Chloride Level 106     MMOL/L


 


Carbon Dioxide Level 20 L  21-32  MMOL/L


 


Anion Gap 12   5-14  MMOL/L


 


Blood Urea Nitrogen 6 L  7-18  MG/DL


 


Creatinine


 0.72 


 


 0.60-1.30


MG/DL


 


Estimat Glomerular Filtration


Rate > 60 


 


  





 


BUN/Creatinine Ratio 8    


 


Glucose Level 96     MG/DL


 


Calcium Level 8.7   8.5-10.1  MG/DL


 


Corrected Calcium 8.7   8.5-10.1  MG/DL


 


Magnesium Level 1.7   1.6-2.4  MG/DL


 


Total Bilirubin 0.4   0.1-1.0  MG/DL


 


Aspartate Amino Transf


(AST/SGOT) 28 


 


 5-34  U/L





 


Alanine Aminotransferase


(ALT/SGPT) 12 


 


 0-55  U/L





 


Alkaline Phosphatase 126     U/L


 


Myoglobin


 24.8 


 


 10.0-92.0


NG/ML


 


Troponin I < 0.028   <0.028  NG/ML


 


B-Type Natriuretic Peptide 46.8   <100.0  PG/ML


 


Total Protein 7.0   6.4-8.2  GM/DL


 


Albumin 4.0   3.2-4.5  GM/DL


 


Prothrombin Time  13.3  12.2-14.7  SEC


 


INR Comment  1.0  0.8-1.4  


 


Activated Partial


Thromboplast Time 


 32 


 24-35  SEC





 


D-Dimer


 


 0.80 H


 0.00-0.49


UG/ML








My Orders





Orders - ECDRIC MAURER


Cbc With Automated Diff (1/13/20 16:51)


Magnesium (1/13/20 16:51)


Chest 1 View, Ap/Pa Only (1/13/20 16:51)


Ekg Tracing (1/13/20 16:51)


Comprehensive Metabolic Panel (1/13/20 16:51)


Myoglobin Serum (1/13/20 16:51)


Protime With Inr (1/13/20 16:51)


Partial Thromboplastin Time (1/13/20 16:51)


O2 (1/13/20 16:51)


Monitor-Rhythm Ecg Trace Only (1/13/20 16:51)


Lipid Panel (1/14/20 06:00)


Ed Iv/Invasive Line Start (1/13/20 16:51)


BNP (1/13/20 16:51)


Fibrin Degradation Products (1/13/20 16:51)


Troponin I (1/13/20 16:51)


Aspirin Chewable Tablet (Baby Aspirin Ch (1/13/20 17:00)


Drug Screen Stat (Urine) (1/13/20 16:51)


Ketorolac Injection (Toradol Injection) (1/13/20 17:15)


Ct Angio Chest W (1/13/20 17:45)


Iohexol Injection (Omnipaque 350 Mg/Ml 1 (1/13/20 18:00)


Received Contrast (Hold Metformin- Contr (1/13/20 18:00)


Sodium Chloride Flush (Catheter Flush Sy (1/13/20 18:00)


Ns (Ivpb) (Sodium Chloride 0.9% Ivpb Bag (1/13/20 18:00)





Medications Given in ED





Current Medications








 Medications  Dose


 Ordered  Sig/Latoya


 Route  Start Time


 Stop Time Status Last Admin


Dose Admin


 


 Aspirin  324 mg  ONCE  ONCE


 PO  1/13/20 17:00


 1/13/20 17:01 DC 1/13/20 17:31


324 MG


 


 Iohexol  100 ml  ONCE  ONCE


 IV  1/13/20 18:00


 1/13/20 18:01 DC 1/13/20 18:52


75 ML


 


 Ketorolac


 Tromethamine  15 mg  ONCE  ONCE


 IVP  1/13/20 17:15


 1/13/20 17:16 DC 1/13/20 17:31


15 MG


 


 Sodium Chloride  10 ml  AS NEEDED  PRN


 IV  1/13/20 18:00


    1/13/20 18:52


10 ML


 


 Sodium Chloride  100 ml  ONCE  ONCE


 IV  1/13/20 18:00


 1/13/20 18:01 DC 1/13/20 18:52


80 ML








Vital Signs/I&O











 1/13/20 1/13/20





 15:43 17:05


 


Temp 36.8 


 


Pulse 80 


 


Resp 15 


 


B/P (MAP) 151/88 (109) 


 


Pulse Ox 98 


 


O2 Delivery Room Air Room Air














Blood Pressure Mean:                    109











Departure


Impression





   Primary Impression:  


   Falls


   Additional Impression:  


   Chest wall pain


Disposition:  01 HOME, SELF-CARE


Condition:  Improved





Departure-Patient Inst.


Decision time for Depature:  19:07


Referrals:  


NO,LOCAL PHYSICIAN (PCP/Family)


Primary Care Physician


Patient Instructions:  Chest Pain (DC)





Add. Discharge Instructions:  


1. Return to ER for any concerns


2. Follow-up with your doctor next week


3. Tylenol and ibuprofen for pain control.





All discharge instructions reviewed with patient and/or family. Voiced patricia jimenez.











CEDRIC MAURER             Jan 13, 2020 17:14

## 2020-01-13 NOTE — DIAGNOSTIC IMAGING REPORT
INDICATION: Chest pain.



Frontal chest obtained at 05:23 p.m. and compared to 01/01/2020.



Heart and mediastinal silhouette are normal in appearance. There

are chronic-appearing increased interstitial markings. There are

COPD changes with hyperinflation. There is no pneumothorax or

pleural fluid.



IMPRESSION: COPD changes and chronic-appearing increased

interstitial markings. No acute consolidation or pleural fluid.



Dictated by: 



  Dictated on workstation # [id number]

## 2020-01-13 NOTE — DIAGNOSTIC IMAGING REPORT
INDICATION: Chest pain.



TECHNIQUE: Multiple contiguous axial images were obtained through

the chest after uneventful bolus administration of intravenous

contrast. 3D reconstructed CTA MIP acquisitions were also

performed.

Auto Exposure Controls were utilized during the CT exam to meet

ALARA standards for radiation dose reduction. 



COMPARISON: 01/01/2020.



The pulmonary parenchymal vessels are well-opacified with no CT

evidence of pulmonary emboli. There is no pleural or pericardial

fluid. There are no enlarged mediastinal or hilar nodes. There is

no enlarged axillary node or chest wall lesion.



Lung parenchymal windows demonstrate some dependent atelectatic

changes in the lung bases but no overt consolidation or focal

mass lesion. Visualized portions of the upper abdomen are

unremarkable.



IMPRESSION: Negative CTA chest.



Dictated by: 



  Dictated on workstation # [id number]

## 2020-08-20 ENCOUNTER — HOSPITAL ENCOUNTER (EMERGENCY)
Dept: HOSPITAL 75 - ER | Age: 60
Discharge: HOME | End: 2020-08-20
Payer: MEDICARE

## 2020-08-20 VITALS — WEIGHT: 154.98 LBS | HEIGHT: 67.99 IN | BODY MASS INDEX: 23.49 KG/M2

## 2020-08-20 VITALS — DIASTOLIC BLOOD PRESSURE: 79 MMHG | SYSTOLIC BLOOD PRESSURE: 118 MMHG

## 2020-08-20 DIAGNOSIS — J44.9: ICD-10-CM

## 2020-08-20 DIAGNOSIS — Z82.49: ICD-10-CM

## 2020-08-20 DIAGNOSIS — Z88.8: ICD-10-CM

## 2020-08-20 DIAGNOSIS — Z79.82: ICD-10-CM

## 2020-08-20 DIAGNOSIS — R12: Primary | ICD-10-CM

## 2020-08-20 DIAGNOSIS — Z91.040: ICD-10-CM

## 2020-08-20 LAB
ALBUMIN SERPL-MCNC: 3.7 GM/DL (ref 3.2–4.5)
ALP SERPL-CCNC: 112 U/L (ref 40–136)
ALT SERPL-CCNC: 14 U/L (ref 0–55)
AMYLASE SERPL-CCNC: 53 U/L (ref 25–125)
APTT BLD: 29 SEC (ref 24–35)
BASOPHILS # BLD AUTO: 0.1 10^3/UL (ref 0–0.1)
BASOPHILS NFR BLD AUTO: 1 % (ref 0–10)
BILIRUB SERPL-MCNC: 0.1 MG/DL (ref 0.1–1)
BUN/CREAT SERPL: 19
CALCIUM SERPL-MCNC: 8.4 MG/DL (ref 8.5–10.1)
CHLORIDE SERPL-SCNC: 110 MMOL/L (ref 98–107)
CO2 SERPL-SCNC: 21 MMOL/L (ref 21–32)
CREAT SERPL-MCNC: 0.73 MG/DL (ref 0.6–1.3)
EOSINOPHIL # BLD AUTO: 0.3 10^3/UL (ref 0–0.3)
EOSINOPHIL NFR BLD AUTO: 4 % (ref 0–10)
ERYTHROCYTE [DISTWIDTH] IN BLOOD BY AUTOMATED COUNT: 12.7 % (ref 10–14.5)
GFR SERPLBLD BASED ON 1.73 SQ M-ARVRAT: > 60 ML/MIN
GLUCOSE SERPL-MCNC: 95 MG/DL (ref 70–105)
HCT VFR BLD CALC: 35 % (ref 35–52)
HGB BLD-MCNC: 12 G/DL (ref 11.5–16)
INR PPP: 0.9 (ref 0.8–1.4)
LYMPHOCYTES # BLD AUTO: 3 X 10^3 (ref 1–4)
LYMPHOCYTES NFR BLD AUTO: 41 % (ref 12–44)
MAGNESIUM SERPL-MCNC: 1.6 MG/DL (ref 1.6–2.4)
MANUAL DIFFERENTIAL PERFORMED BLD QL: NO
MCH RBC QN AUTO: 33 PG (ref 25–34)
MCHC RBC AUTO-ENTMCNC: 35 G/DL (ref 32–36)
MCV RBC AUTO: 94 FL (ref 80–99)
MONOCYTES # BLD AUTO: 0.4 X 10^3 (ref 0–1)
MONOCYTES NFR BLD AUTO: 6 % (ref 0–12)
NEUTROPHILS # BLD AUTO: 3.6 X 10^3 (ref 1.8–7.8)
NEUTROPHILS NFR BLD AUTO: 49 % (ref 42–75)
PLATELET # BLD: 224 10^3/UL (ref 130–400)
PMV BLD AUTO: 9.4 FL (ref 7.4–10.4)
POTASSIUM SERPL-SCNC: 3.6 MMOL/L (ref 3.6–5)
PROT SERPL-MCNC: 6.5 GM/DL (ref 6.4–8.2)
PROTHROMBIN TIME: 12.6 SEC (ref 12.2–14.7)
SODIUM SERPL-SCNC: 137 MMOL/L (ref 135–145)
WBC # BLD AUTO: 7.4 10^3/UL (ref 4.3–11)

## 2020-08-20 PROCEDURE — 71045 X-RAY EXAM CHEST 1 VIEW: CPT

## 2020-08-20 PROCEDURE — 85610 PROTHROMBIN TIME: CPT

## 2020-08-20 PROCEDURE — 83874 ASSAY OF MYOGLOBIN: CPT

## 2020-08-20 PROCEDURE — 80053 COMPREHEN METABOLIC PANEL: CPT

## 2020-08-20 PROCEDURE — 84484 ASSAY OF TROPONIN QUANT: CPT

## 2020-08-20 PROCEDURE — 93041 RHYTHM ECG TRACING: CPT

## 2020-08-20 PROCEDURE — 82150 ASSAY OF AMYLASE: CPT

## 2020-08-20 PROCEDURE — 93005 ELECTROCARDIOGRAM TRACING: CPT

## 2020-08-20 PROCEDURE — 83735 ASSAY OF MAGNESIUM: CPT

## 2020-08-20 PROCEDURE — 36415 COLL VENOUS BLD VENIPUNCTURE: CPT

## 2020-08-20 PROCEDURE — 85025 COMPLETE CBC W/AUTO DIFF WBC: CPT

## 2020-08-20 PROCEDURE — 85730 THROMBOPLASTIN TIME PARTIAL: CPT

## 2020-08-20 RX ADMIN — ALUMINUM HYDROXIDE, MAGNESIUM HYDROXIDE, AND DIMETHICONE ONE ML: 400; 400; 40 SUSPENSION ORAL at 13:13

## 2020-08-20 RX ADMIN — ASPIRIN 81 MG CHEWABLE TABLET ONE MG: 81 TABLET CHEWABLE at 11:55

## 2020-08-20 RX ADMIN — LIDOCAINE HYDROCHLORIDE ONE ML: 20 SOLUTION ORAL; TOPICAL at 13:13

## 2020-08-20 NOTE — DIAGNOSTIC IMAGING REPORT
INDICATION: Chest pain.



EXAMINATION: Frontal chest obtained at 12:46 p.m. and is compared

to 01/13/2020.



FINDINGS: Heart and mediastinal silhouette are normal in

appearance. There is mild hyperinflation. There is no

pneumothorax or pleural fluid. There are mild chronic-appearing

increased interstitial markings.



IMPRESSION:



No acute process in the chest and no significant change from

01/13/2020.



Dictated by: 



  Dictated on workstation # [id number]

## 2020-08-20 NOTE — ED CARDIAC GENERAL
History of Present Illness


General


Chief Complaint:  Chest Pain


Stated Complaint:  PREVIOUS CHEST PAIN


Nursing Triage Note:  


PT AMB TO RM 8 AFTER BEING SENT OUT BY [facility] FOR INTERMITTENT CP. PT STATES 


STARTED 2-3 DAYS AGO. STATES FEELS IT IS MORE IN HER STOMACH AND HAS HAD 


BURNING. STATES HAS TROUBLE WITH FOOD GOING DOWN. PT IS NOT CURRENTLY HAVING 


PAIN AT TIME OF TRIAGE.


Source:  patient


Exam Limitations:  no limitations





History of Present Illness


Date Seen by Provider:  Aug 20, 2020


Time Seen by Provider:  11:46


Initial Comments


59-year-old female who presents to the emergency room with complaints of 

intermittent chest pain for the past 2-3 weeks but for the past 2-3 days reports

as being worse. She was sent out by [facility] for evaluation. She 

reports that it is more down in her stomach that radiates up into her chest and 

has had heartburn with eating and food going down. She is symptom-free at this 

time.


Timing/Duration:  2-3 days


Location:  epigastric


NTG SL PTA:  No


ASA po PTA:  No


Associated Systoms:  Chest Pain





Allergies and Home Medications


Allergies


Coded Allergies:  


     propoxyphene (Unverified  Allergy, Mild, 4/29/10)


     adhesive (Unverified  Allergy, Unknown, RASH, 9/7/17)


     Iodinated Contrast Media (Verified  Adverse Reaction, Intermediate, NAUSEA,

10/26/13)


   


   SEVERE VOMITTING





Home Medications


Aspirin 325 Mg Tabec, 325 MG PO DAILY, (Reported)


Ipratropium Bromide 12.9 Gm Aers, 2 PUFF IH Q6H


   Prescribed by: SWAPNA MAIER on 7/30/19 1334





Patient Home Medication List


Home Medication List Reviewed:  Yes





Review of Systems


Review of Systems


Constitutional:  see HPI; No chills, No fever


Cardiovascular:  See HPI, Chest Pain


Gastrointestinal:  See HPI, Abdominal Pain





All Other Systems Reviewed


Negative Unless Noted:  Yes





Past Medical-Social-Family Hx


Past Med/Social Hx:  Reviewed Nursing Past Med/Soc Hx


Patient Social History


Alcohol Use:  Denies Use


Recreational Drug Use:  No


Type Used:  Cigarettes


2nd Hand Smoke Exposure:  Yes


Recent Foreign Travel:  No


Contact w/Someone Who Travel:  No


Recent Infectious Disease Expo:  No


Recent Hopitalizations:  Yes (1/20 left ama)





Immunizations Up To Date


Tetanus Booster (TDap):  Unknown


Date of Pneumonia Vaccine:  Jan 1, 2009





Seasonal Allergies


Seasonal Allergies:  Yes ("SINUS PROBLEMS")





Past Medical History


Surgeries:  Yes (HYST,APPY,SAMARIA, COCCYX BONE, R HIP)


Appendectomy, Gallbladder, Hysterectomy, Orthopedic


Respiratory:  Yes


COPD


Cardiac:  Yes (WPW, tachycardia)


Neurological:  No


Reproductive Disorders:  No


GYN History:  Hysterectomy


Sexually Transmitted Disease:  No


HIV/AIDS:  No


Kidney Infection, Kidney Stones


Gastrointestinal:  Yes


Gastroesophageal Reflux, Ulcer


Musculoskeletal:  Yes


Arthritis


Endocrine:  No


Cataract


Loss of Vision:  Denies


Hearing Impairment:  Denies


Cancer:  No


Psychosocial:  No


Sleep Difficulties, Anxiety, Depression


Integumentary:  No (BLISTERS FROM RECENT TAPE)


Blood Disorders:  No


Adverse Reaction/Blood Tranf:  No





Family Medical History


Reviewed Nursing Family Hx





Cancer


  03 MOTHER (SPINAL AND KIDNEY)


Cataract


  03 MOTHER


Congestive heart failure


  03 FATHER


Family history: Gastrointestinal disease


  09 BROTHER


Family history: Hypertension


  03 FATHER


  03 MOTHER


  09 BROTHER


Myocardial infarction


  03 FATHER


  09 BROTHER


Parkinson's disease


  03 MOTHER


Stroke


  03 MOTHER


  09 BROTHER (TIA)


  09 SISTER (TIA)


No Pertinent Family Hx





Physical Exam


Vital Signs





Vital Signs - First Documented








 8/20/20





 11:41


 


Temp 36.8


 


Pulse 78


 


Resp 20


 


B/P (MAP) 122/74 (90)


 


Pulse Ox 98


 


O2 Delivery Room Air





Capillary Refill : Less Than 3 Seconds


Height, Weight, BMI


Height: 5'5.00"


Weight: 154lbs. oz. 69.213272yd; 23.00 BMI


Method:Stated


General Appearance:  No Apparent Distress, WD/WN


Respiratory:  Chest Non Tender, Lungs Clear, Normal Breath Sounds, No Accessory 

Muscle Use, No Respiratory Distress


Cardiovascular:  Regular Rate, Rhythm, No Edema, No Gallop, No JVD, No Murmur, 

Normal Peripheral Pulses


Gastrointestinal:  Normal Bowel Sounds, No Organomegaly, No Pulsatile Mass, 

Soft, Tenderness (epigastric and right upper quadrant tenderness)


Neurologic/Psychiatric:  Alert, Oriented x3, Normal Mood/Affect


Skin:  Normal Color, Warm/Dry





Progress/Results/Core Measures


Results/Orders


Lab Results





Laboratory Tests








Test


 8/20/20


12:04 Range/Units


 


 


White Blood Count


 7.4 


 4.3-11.0


10^3/uL


 


Red Blood Count


 3.69 L


 4.35-5.85


10^6/uL


 


Hemoglobin 12.0  11.5-16.0  G/DL


 


Hematocrit 35  35-52  %


 


Mean Corpuscular Volume 94  80-99  FL


 


Mean Corpuscular Hemoglobin 33  25-34  PG


 


Mean Corpuscular Hemoglobin


Concent 35 


 32-36  G/DL





 


Red Cell Distribution Width 12.7  10.0-14.5  %


 


Platelet Count


 224 


 130-400


10^3/uL


 


Mean Platelet Volume 9.4  7.4-10.4  FL


 


Neutrophils (%) (Auto) 49  42-75  %


 


Lymphocytes (%) (Auto) 41  12-44  %


 


Monocytes (%) (Auto) 6  0-12  %


 


Eosinophils (%) (Auto) 4  0-10  %


 


Basophils (%) (Auto) 1  0-10  %


 


Neutrophils # (Auto) 3.6  1.8-7.8  X 10^3


 


Lymphocytes # (Auto) 3.0  1.0-4.0  X 10^3


 


Monocytes # (Auto) 0.4  0.0-1.0  X 10^3


 


Eosinophils # (Auto)


 0.3 


 0.0-0.3


10^3/uL


 


Basophils # (Auto)


 0.1 


 0.0-0.1


10^3/uL


 


Prothrombin Time 12.6  12.2-14.7  SEC


 


INR Comment 0.9  0.8-1.4  


 


Activated Partial


Thromboplast Time 29 


 24-35  SEC





 


Sodium Level 137  135-145  MMOL/L


 


Potassium Level 3.6  3.6-5.0  MMOL/L


 


Chloride Level 110 H   MMOL/L


 


Carbon Dioxide Level 21  21-32  MMOL/L


 


Anion Gap 6  5-14  MMOL/L


 


Blood Urea Nitrogen 14  7-18  MG/DL


 


Creatinine


 0.73 


 0.60-1.30


MG/DL


 


Estimat Glomerular Filtration


Rate > 60 


  





 


BUN/Creatinine Ratio 19   


 


Glucose Level 95    MG/DL


 


Calcium Level 8.4 L 8.5-10.1  MG/DL


 


Corrected Calcium 8.6  8.5-10.1  MG/DL


 


Magnesium Level 1.6  1.6-2.4  MG/DL


 


Total Bilirubin 0.1  0.1-1.0  MG/DL


 


Aspartate Amino Transf


(AST/SGOT) 24 


 5-34  U/L





 


Alanine Aminotransferase


(ALT/SGPT) 14 


 0-55  U/L





 


Alkaline Phosphatase 112    U/L


 


Myoglobin


 31.8 


 10.0-92.0


NG/ML


 


Troponin I < 0.028  <0.028  NG/ML


 


Total Protein 6.5  6.4-8.2  GM/DL


 


Albumin 3.7  3.2-4.5  GM/DL


 


Amylase Level 53    U/L








My Orders





Orders - LEWIS HIDALGO


Cbc With Automated Diff (8/20/20 11:48)


Magnesium (8/20/20 11:48)


Chest 1 View, Ap/Pa Only (8/20/20 11:48)


Ekg Tracing (8/20/20 11:48)


Comprehensive Metabolic Panel (8/20/20 11:48)


Myoglobin Serum (8/20/20 11:48)


Protime With Inr (8/20/20 11:48)


Partial Thromboplastin Time (8/20/20 11:48)


O2 (8/20/20 11:48)


Monitor-Rhythm Ecg Trace Only (8/20/20 11:48)


Lipid Panel (8/21/20 06:00)


Ed Iv/Invasive Line Start (8/20/20 11:48)


Amylase (8/20/20 11:48)


Troponin I (8/20/20 11:48)


Aspirin Chewable Tablet (Baby Aspirin Ch (8/20/20 12:00)


Lidocaine 2% Viscous 15 Ml (Xylocaine Vi (8/20/20 13:00)


Antacid  Suspension (Mylanta  Suspension (8/20/20 13:00)





Medications Given in ED





Current Medications








 Medications  Dose


 Ordered  Sig/Latoya


 Route  Start Time


 Stop Time Status Last Admin


Dose Admin


 


 Al Hydrox/Mg


 Hydrox/Simethicone  30 ml  ONCE  ONCE


 PO  8/20/20 13:00


 8/20/20 13:01 DC 8/20/20 13:13


30 ML


 


 Aspirin  324 mg  ONCE  ONCE


 PO  8/20/20 12:00


 8/20/20 12:01 DC 8/20/20 11:55


324 MG


 


 Lidocaine HCl  15 ml  ONCE  ONCE


 PO  8/20/20 13:00


 8/20/20 13:01 DC 8/20/20 13:13


15 ML








Vital Signs/I&O











 8/20/20





 11:41


 


Temp 36.8


 


Pulse 78


 


Resp 20


 


B/P (MAP) 122/74 (90)


 


Pulse Ox 98


 


O2 Delivery Room Air














Blood Pressure Mean:                    90











Departure


Impression





   Primary Impression:  


   Heartburn


Disposition:  01 HOME, SELF-CARE


Condition:  Stable/Unchanged





Departure-Patient Inst.


Decision time for Depature:  14:26


Referrals:  


NO,LOCAL PHYSICIAN (PCP/Family)


Primary Care Physician


Patient Instructions:  Chest Pain That Is Not Caused by the Heart (DC)





Add. Discharge Instructions:  


You may use over-the-counter Pepcid and Tums as needed for heartburn. Follow-up 

with your primary care provider within 1 week for recheck. You may need an 

evaluation to rule out a ulcer. Return back to the emergency room for worsening 

symptoms, shortness of breath, chest pain, or any other concerns as needed.





All discharge instructions reviewed with patient and/or family. Voiced 

understanding.











LEWIS HIDALGO                  Aug 20, 2020 11:56

## 2021-10-11 ENCOUNTER — HOSPITAL ENCOUNTER (EMERGENCY)
Dept: HOSPITAL 75 - ER | Age: 61
Discharge: HOME | End: 2021-10-11
Payer: MEDICARE

## 2021-10-11 VITALS — SYSTOLIC BLOOD PRESSURE: 125 MMHG | DIASTOLIC BLOOD PRESSURE: 76 MMHG

## 2021-10-11 VITALS — BODY MASS INDEX: 24.33 KG/M2 | HEIGHT: 66.97 IN | WEIGHT: 154.98 LBS

## 2021-10-11 DIAGNOSIS — F32.9: ICD-10-CM

## 2021-10-11 DIAGNOSIS — F19.90: ICD-10-CM

## 2021-10-11 DIAGNOSIS — F17.210: ICD-10-CM

## 2021-10-11 DIAGNOSIS — I10: ICD-10-CM

## 2021-10-11 DIAGNOSIS — F41.9: ICD-10-CM

## 2021-10-11 DIAGNOSIS — Z79.82: ICD-10-CM

## 2021-10-11 DIAGNOSIS — Z79.899: ICD-10-CM

## 2021-10-11 DIAGNOSIS — R10.31: Primary | ICD-10-CM

## 2021-10-11 DIAGNOSIS — K21.9: ICD-10-CM

## 2021-10-11 DIAGNOSIS — J44.9: ICD-10-CM

## 2021-10-11 LAB
APTT PPP: YELLOW S
BACTERIA #/AREA URNS HPF: (no result) /HPF
BARBITURATES UR QL: NEGATIVE
BENZODIAZ UR QL SCN: POSITIVE
BILIRUB UR QL STRIP: NEGATIVE
COCAINE UR QL: NEGATIVE
FIBRINOGEN PPP-MCNC: CLEAR MG/DL
GLUCOSE UR STRIP-MCNC: NEGATIVE MG/DL
KETONES UR QL STRIP: NEGATIVE
LEUKOCYTE ESTERASE UR QL STRIP: NEGATIVE
METHADONE UR QL SCN: NEGATIVE
METHAMPHETAMINE SCREEN URINE S: POSITIVE
NITRITE UR QL STRIP: NEGATIVE
OPIATES UR QL SCN: POSITIVE
OXYCODONE UR QL: NEGATIVE
PH UR STRIP: 6 [PH] (ref 5–9)
PROPOXYPH UR QL: NEGATIVE
PROT UR QL STRIP: (no result)
RBC #/AREA URNS HPF: (no result) /HPF
SP GR UR STRIP: >=1.03 (ref 1.02–1.02)
TRICYCLICS UR QL SCN: NEGATIVE
WBC #/AREA URNS HPF: (no result) /HPF
YEAST #/AREA URNS HPF: (no result) /HPF

## 2021-10-11 PROCEDURE — 81000 URINALYSIS NONAUTO W/SCOPE: CPT

## 2021-10-11 PROCEDURE — 93041 RHYTHM ECG TRACING: CPT

## 2021-10-11 PROCEDURE — 80306 DRUG TEST PRSMV INSTRMNT: CPT

## 2021-10-11 PROCEDURE — 73552 X-RAY EXAM OF FEMUR 2/>: CPT

## 2021-10-11 NOTE — DIAGNOSTIC IMAGING REPORT
INDICATION: Fall and right hip pain.



TIME OF EXAM: 11:02 AM



2 views of the right femur demonstrate postoperative changes of

total hip arthroplasty. Prosthetic elements appear to be in good

position. Femur is intact. No acute fracture is seen.



IMPRESSION: No acute bony abnormality is detected.



Dictated by: 



  Dictated on workstation # [id number]

## 2021-10-11 NOTE — DIAGNOSTIC IMAGING REPORT
INDICATION: Fall and hip pain.



TIME OF EXAM: 11:00 a.m.



FINDINGS: AP view of the pelvis and two views of the right hip

were obtained. There are postop changes of bilateral total hip

arthroplasties. Alignment appears normal. No fractures are seen.

Rami are intact. SI joints and symphysis are not widened.



IMPRESSION: No acute abnormality is detected.



Dictated by: 



  Dictated on workstation # [id number]

## 2021-10-11 NOTE — ED LOWER EXTREMITY
General


Chief Complaint:  Trauma-Non Activation


Stated Complaint:  FALL R HIP PAIN


Nursing Triage Note:  


WALKING AND SLIPPED ON SOME WATER AND IT CAUSED HER TO FALL INJURING HER RIGHT 


HIP, ARRIVES BY EMS TO ROOM 7


Source:  patient (EXTREMELY POOR AND DIFFICULT HISTORIAN)





History of Present Illness


Date Seen by Provider:  Oct 11, 2021


Time Seen by Provider:  10:30


Initial Comments


PT ARRIVES VIA EMS FROM A LOCAL [facility]


PT WAS INSIDE THE [facility] AND ALLEGEDLY FELL--PT REPEATS "I DON'T KNOW WHAT 

HAPPENED" 


INCIDENT WAS NOT WITNESSED


PT DENIES HITTING HER HEAD OR HAVING LOSS OF CONSCIOUSNESS


C/O PAIN TO RIGHT GROIN AREA


DENIES PARESTHESIAS OR MOTOR DEFICITS


DENIES PAIN IN BACK





PT STATES SHE HAS HAD PRIOR RIGHT AND LEFT HIP REPLACEMENTS FOR OSTEOARTHRITIS 

IN 2010, THEN HAD A REVISION OF RIGHT HIP IN 2013--ALL BY DR. ZAVALETA 





PT REFUSED PAIN MEDICATION BY EMS--STATES SHE HAS HISTORY OF ADDICTION TO PAIN 

MEDICATION





PT IS SUPPOSED TO BE ON BLOOD PRESSURE MEDICATION BUT DOES NOT TAKE THEM, AND 

DOES NOT FOLLOW UP WITH ANYONE





PT HAS NOT HAD COVID-19 VACCINE





PCP: BALDEMAR "ONE TIME" AND DENIES SEEING ANY OTHER DOCTORS





Allergies and Home Medications


Allergies


Coded Allergies:  


     propoxyphene (Unverified  Allergy, Mild, 4/29/10)


     adhesive (Unverified  Allergy, Unknown, RASH, 9/7/17)


     Iodinated Contrast Media (Verified  Adverse Reaction, Intermediate, NAUSEA,

10/26/13)


   SEVERE VOMITTING





Patient Home Medication List


Home Medication List Reviewed:  Yes


Alprazolam (Alprazolam) 1 Mg Tablet, (Reported)


   Entered as Reported by: EVA PROCTOR on 9/7/17 1614


Aspirin (Aspirin Ec 325 Mg) 325 Mg Tabec, 325 MG PO DAILY, (Reported)


   Entered as Reported by: ALEX BONILLA on 11/13/13 0914


Citalopram Hydrobromide (Citalopram HBr) 10 Mg Tablet, (Reported)


   Entered as Reported by: EVA PROCTOR on 9/7/17 1614


Cyclobenzaprine HCl (Cyclobenzaprine HCl) 10 Mg Tablet, 10 MG PO Q8H PRN for 

SPASMS


   Prescribed by: ANABEL BANKS on 10/11/21 1150


Fluticasone/Salmeterol (Advair 500-50 Diskus) 1 Each Blst.w.dev, (Reported)


   Entered as Reported by: EVA PROCTOR on 9/7/17 1614


Ipratropium Bromide (Atrovent Hfa) 12.9 Gm Aers, 2 PUFF IH Q6H


   Prescribed by: SWAPNA MAIER on 7/30/19 1334


Lisinopril (Lisinopril) 20 Mg Tablet, (Reported)


   Entered as Reported by: EVA PROCTOR on 9/7/17 1614


Naproxen (Naproxen) 500 Mg Tablet.dr, 500 MG PO BID


   Prescribed by: ANABEL BANKS on 10/11/21 1150


Omeprazole (Omeprazole) 20 Mg Capsule., (Reported)


   Entered as Reported by: EVA PROCTOR on 9/7/17 1614


Ranitidine HCl (Ranitidine HCl) 150 Mg Tablet, (Reported)


   Entered as Reported by: EVA PROCTOR on 9/7/17 1614


Tiotropium Bromide (Spiriva) 1 Inh Aerp, (Reported)


   Entered as Reported by: EVA PROCTOR on 9/7/17 1614





Review of Systems


Constitutional:  no symptoms reported


Cardiovascular:  no symptoms reported


Gastrointestinal:  no symptoms reported


Genitourinary:  no symptoms reported


Musculoskeletal:  see HPI


Skin:  no symptoms reported


Psychiatric/Neurological:  No Symptoms Reported





Past Medical-Social-Family Hx


Patient Social History


Tobacco Use?:  Yes (1 PPD)


Tobacco type used:  Cigarettes


Smoking Status:  Current Everyday Smoker (1 PPD)


Substance use?:  Yes


Substance type:  Methamphetamine, Opiates/Opioids


Alcohol Use?:  No (DENIES)





Immunizations Up To Date


Tetanus Booster (TDap):  Unknown





Seasonal Allergies


Seasonal Allergies:  Yes ("SINUS PROBLEMS")





Past Medical History


Surgeries:  Yes (HYST,APPY,SAMARIA, COCCYX BONE, BILAT HIP REPL.; BOWEL SURGERY)


Appendectomy, Bowel Surgery, Gallbladder, Hysterectomy, Joint Replacement, 

Orthopedic


Respiratory:  Yes


COPD


Cardiac:  Yes (WPW, tachycardia)


Hypertension, Irregular Heartbeat


Neurological:  No


Reproductive Disorders:  No


GYN History:  Hysterectomy


Sexually Transmitted Disease:  No


HIV/AIDS:  No


Genitourinary:  Yes


Kidney Infection, Kidney Stones


Gastrointestinal:  Yes


Gastroesophageal Reflux, Ulcer


Musculoskeletal:  Yes (BILAT HIP REPLACEMENTS)


Arthritis


Endocrine:  No


HEENT:  Yes


Cataract


Loss of Vision:  Denies


Hearing Impairment:  Denies


Cancer:  No


Psychosocial:  Yes (SUBSTANCE ABUSE)


Sleep Difficulties, Anxiety, Depression


Integumentary:  No


Blood Disorders:  No


Adverse Reaction/Blood Tranf:  No





Family Medical History





Cancer


  03 MOTHER (SPINAL AND KIDNEY)


Cataract


  03 MOTHER


Congestive heart failure


  03 FATHER


Family history: Gastrointestinal disease


  09 BROTHER


Family history: Hypertension


  03 FATHER


  03 MOTHER


  09 BROTHER


Myocardial infarction


  03 FATHER


  09 BROTHER


Parkinson's disease


  03 MOTHER


Stroke


  03 MOTHER


  09 BROTHER (TIA)


  09 SISTER (TIA)


No Pertinent Family Hx








SOCIAL HISTORY:


-SMOKES 1 PPD


-ETOH--DENIES USE


-DRUGS--HX OF OPIATE ABUSE, UDS + FOR METH 10/11/21





PAST SURGICAL HISTORY:


-BILATERAL HIP REPLACEMENTS FOR OSTEOARTHRITIS IN 2010 BY DR. ZAVALETA


-RIGHT HIP REVISION 2013 BY DR. REVEAL


-APPENDECTOMY


-CHOLECYSTECTOMY


-HYSTERECTOMY


-BOWEL SURGERY, PER PT, UNKNOWN REASON 


-COLONOSCOPY 2009





LONG HISTORY OF NON-COMPLIANCE





Physical Exam


Vital Signs





Vital Signs - First Documented








 10/11/21





 10:30


 


Temp 36.2


 


Pulse 115


 


Resp 24


 


B/P (MAP) 146/81 (102)


 


Pulse Ox 97


 


O2 Delivery Room Air





Capillary Refill : Less Than 3 Seconds


Height, Weight, BMI


Height: 5'5.00"


Weight: 154lbs. oz. 69.891040kf; 24.00 BMI


Method:Stated


General Appearance:  thin, other (ANXIOUS, CONSTANT MOVEMENTS OF ENTIRE BODY, 

SPEECH IS VERY RAPID AND ERRATIC, ANXIOUS, FILTHY. )


Neck:  normal inspection


Cardiovascular:  normal peripheral pulses, regular rate, rhythm, no murmur


Respiratory:  normal breath sounds


Gastrointestinal:  non tender, soft


Back:  normal inspection, no CVA tenderness, no vertebral tenderness


Hips:  left hip normal inspection; right hip other (TENDERNESS TO RIGHT INGUINAL

AREA. NO OBVIOUS DEFORMITY. NO EXTERNAL EVIDENCE OF TRAUMA. NO SHORTENING OR 

ROTATION. NO SWELLING. DISTAL MOTOR/SENSORY/VASCULAR INTACT. )


Legs:  bilateral leg normal inspection


Knees:  bilateral knee normal inspection


Ankles:  bilateral ankle normal inspection


Feet:  bilateral foot normal inspection


Neurologic/Tendon:  normal sensation, normal motor functions, normal tendon 

functions


Neurologic/Psychiatric:  no motor/sensory deficits, alert, oriented x 3


Skin:  normal color, warm/dry, tattoos/piercings (TATTOOS), other (NO EXTERNAL 

EVIDENCE OF TRAUMA ANYWHERE)





Progress/Results/Core Measures


Results/Orders


Lab Results





Laboratory Tests








Test


 10/11/21


11:20 Range/Units


 


 


Urine Color YELLOW   


 


Urine Clarity CLEAR   


 


Urine pH 6.0  5-9  


 


Urine Specific Gravity >=1.030  1.016-1.022  


 


Urine Protein 1+ H NEGATIVE  


 


Urine Glucose (UA) NEGATIVE  NEGATIVE  


 


Urine Ketones NEGATIVE  NEGATIVE  


 


Urine Nitrite NEGATIVE  NEGATIVE  


 


Urine Bilirubin NEGATIVE  NEGATIVE  


 


Urine Urobilinogen 1.0  < = 1.0  MG/DL


 


Urine Leukocyte Esterase NEGATIVE  NEGATIVE  


 


Urine RBC (Auto) NEGATIVE  NEGATIVE  


 


Urine RBC NONE   /HPF


 


Urine WBC 0-2   /HPF


 


Urine Squamous Epithelial


Cells 10-25 H


  /HPF





 


Urine Crystals NONE   /LPF


 


Urine Bacteria MODERATE H  /HPF


 


Urine Casts NONE   /LPF


 


Urine Mucus NEGATIVE   /LPF


 


Urine Yeast FEW H  /HPF


 


Urine Culture Indicated NO   


 


Urine Opiates Screen POSITIVE H NEGATIVE  


 


Urine Oxycodone Screen NEGATIVE  NEGATIVE  


 


Urine Methadone Screen NEGATIVE  NEGATIVE  


 


Urine Propoxyphene Screen NEGATIVE  NEGATIVE  


 


Urine Barbiturates Screen NEGATIVE  NEGATIVE  


 


Ur Tricyclic Antidepressants


Screen NEGATIVE 


 NEGATIVE  





 


Urine Phencyclidine Screen NEGATIVE  NEGATIVE  


 


Urine Amphetamines Screen POSITIVE H NEGATIVE  


 


Urine Methamphetamines Screen POSITIVE H NEGATIVE  


 


Urine Benzodiazepines Screen POSITIVE H NEGATIVE  


 


Urine Cocaine Screen NEGATIVE  NEGATIVE  


 


Urine Cannabinoids Screen NEGATIVE  NEGATIVE  








My Orders





Orders - ANABEL BANKS DO


Ed Iv/Invasive Line Start (10/11/21 10:35)


Monitor-Rhythm Ecg Trace Only (10/11/21 10:35)


Femur, Right, 2 Views (10/11/21 10:35)


Pelvis With Right Hip 2-3views (10/11/21 10:35)


Drug Screen Stat (Urine) (10/11/21 10:35)


Ua Culture If Indicated (10/11/21 10:35)





Vital Signs/I&O











 10/11/21 10/11/21 10/11/21





 10:30 10:34 12:03


 


Temp 36.2 36.2 


 


Pulse 115 115 102


 


Resp 24 24 20


 


B/P (MAP) 146/81 (102) 146/81 (102) 125/76


 


Pulse Ox 97 97 97


 


O2 Delivery Room Air Room Air Room Air














Blood Pressure Mean:                    102











Progress


Progress Note :  


Progress Note


PT REFUSES IV AND REFUSES ANY MEDICATIONS





PT WALKS OUT OF ER WITHOUT DIFFICULTY





Diagnostic Imaging





Comments


XRAYS--PER RADIOLOGIST REPORTS AT 1148





PELVIS AND RIGHT HIP-


FINDINGS: AP view of the pelvis and two views of the right hip


were obtained. There are postop changes of bilateral total hip


arthroplasties. Alignment appears normal. No fractures are seen.


Rami are intact. SI joints and symphysis are not widened.





IMPRESSION: No acute abnormality is detected.








RIGHT FEMUR--


2 views of the right femur demonstrate postoperative changes of


total hip arthroplasty. Prosthetic elements appear to be in good


position. Femur is intact. No acute fracture is seen.





IMPRESSION: No acute bony abnormality is detected.


   Reviewed:  Reviewed by Me





Departure


Impression





   Primary Impression:  


   Right groin pain


   Additional Impression:  


   Illicit drug use


Disposition:  01 HOME, SELF-CARE


Condition:  Stable





Departure-Patient Inst.


Decision time for Depature:  11:49


Referrals:  


NO,LOCAL PHYSICIAN (PCP)


[place] Care Phys[place]








[facility] [place] Instructions:  Groin Strain ED, Polysubstance Use Disorder (DC)





Add. Discharge Instructions:  


ALTERNATE ICE AND HEAT TO SORE AREA AT 20 MINUTE INTERVALS





ACTIVITIES AS TOLERATED





FOLLOW UP WITH [facility] IN 4-5 DAYS FOR FURTHER CARE





All discharge instructions reviewed with patient and/or family. Voiced u

nderstanding.


Scripts


Cyclobenzaprine HCl (Cyclobenzaprine HCl) 10 Mg Tablet


10 MG PO Q8H PRN for SPASMS, #15 TAB 0 Refills


   Prov: ANABEL BANKS DO         10/11/21 


Naproxen (Naproxen) 500 Mg Tablet.dr


500 MG PO BID, #20 TAB


   Prov: ANABEL BANKS DO         10/11/21











ANABEL BANKS DO                 Oct 11, 2021 10:50